# Patient Record
Sex: FEMALE | Race: WHITE | NOT HISPANIC OR LATINO | Employment: OTHER
[De-identification: names, ages, dates, MRNs, and addresses within clinical notes are randomized per-mention and may not be internally consistent; named-entity substitution may affect disease eponyms.]

---

## 2017-07-22 ENCOUNTER — HEALTH MAINTENANCE LETTER (OUTPATIENT)
Age: 57
End: 2017-07-22

## 2017-09-01 ENCOUNTER — TRANSFERRED RECORDS (OUTPATIENT)
Dept: HEALTH INFORMATION MANAGEMENT | Facility: CLINIC | Age: 57
End: 2017-09-01

## 2017-09-01 LAB
ALT SERPL-CCNC: 18 U/L (ref 7–52)
AST SERPL-CCNC: 22 U/L (ref 13–39)
CREAT SERPL-MCNC: 0.92 MG/DL (ref 0.6–1.3)
GFR SERPL CREATININE-BSD FRML MDRD: 68.9 ML/MIN/1.73M2
GLUCOSE SERPL-MCNC: 92 MG/DL (ref 70–110)
POTASSIUM SERPL-SCNC: 4.1 MMOL/L (ref 3.5–5.1)

## 2017-10-23 ENCOUNTER — RADIANT APPOINTMENT (OUTPATIENT)
Dept: GENERAL RADIOLOGY | Facility: CLINIC | Age: 57
End: 2017-10-23
Attending: FAMILY MEDICINE
Payer: COMMERCIAL

## 2017-10-23 ENCOUNTER — OFFICE VISIT (OUTPATIENT)
Dept: URGENT CARE | Facility: URGENT CARE | Age: 57
End: 2017-10-23
Payer: COMMERCIAL

## 2017-10-23 VITALS
SYSTOLIC BLOOD PRESSURE: 110 MMHG | WEIGHT: 130 LBS | TEMPERATURE: 98.3 F | BODY MASS INDEX: 20.98 KG/M2 | HEART RATE: 76 BPM | DIASTOLIC BLOOD PRESSURE: 70 MMHG | OXYGEN SATURATION: 98 % | RESPIRATION RATE: 14 BRPM

## 2017-10-23 DIAGNOSIS — S63.502A WRIST SPRAIN, LEFT, INITIAL ENCOUNTER: Primary | ICD-10-CM

## 2017-10-23 DIAGNOSIS — S63.502A WRIST SPRAIN, LEFT, INITIAL ENCOUNTER: ICD-10-CM

## 2017-10-23 DIAGNOSIS — Z23 NEED FOR PROPHYLACTIC VACCINATION AND INOCULATION AGAINST INFLUENZA: ICD-10-CM

## 2017-10-23 PROCEDURE — 99203 OFFICE O/P NEW LOW 30 MIN: CPT | Performed by: FAMILY MEDICINE

## 2017-10-23 PROCEDURE — 90686 IIV4 VACC NO PRSV 0.5 ML IM: CPT | Performed by: FAMILY MEDICINE

## 2017-10-23 PROCEDURE — 90471 IMMUNIZATION ADMIN: CPT | Performed by: FAMILY MEDICINE

## 2017-10-23 PROCEDURE — 73110 X-RAY EXAM OF WRIST: CPT | Mod: LT

## 2017-10-23 NOTE — NURSING NOTE
"Yue Case is a 57 year old female.      Chief Complaint   Patient presents with     Urgent Care     Musculoskeletal Problem     pt is here for L wrist pain - started yesterday when she fell - No head injury        Initial /70 (BP Location: Right arm, Cuff Size: Adult Regular)  Pulse 76  Temp 98.3  F (36.8  C) (Oral)  Resp 14  Wt 130 lb (59 kg)  SpO2 98%  BMI 20.98 kg/m2 Estimated body mass index is 20.98 kg/(m^2) as calculated from the following:    Height as of 9/29/15: 5' 6\" (1.676 m).    Weight as of this encounter: 130 lb (59 kg).  Medication Reconciliation: complete      Questioned patient about current smoking habits.  Pt. has never smoked.      Aminah Gutierrez CMA      "

## 2017-10-23 NOTE — MR AVS SNAPSHOT
After Visit Summary   10/23/2017    Yue Case    MRN: [id number]           Patient Information     Date Of Birth          1960        Visit Information        Provider Department      10/23/2017 6:10 PM Kanika Gross MD [facility]        Today's Diagnoses     Wrist sprain, left, initial encounter    -  1      Care Instructions      Understanding a Wrist Sprain    A ligament is a strong band of tissue that connects bone to bone. The wrist has many ligaments. If any of these get stretched or torn, this is called a wrist sprain. A wrist sprain can be painful. It can also limit movement and use of the wrist and hand. Depending on the severity of the sprain, it may take a few weeks or longer for the wrist to heal.  Causes of a wrist sprain  Wrist sprains are most often caused by falling and landing on an outstretched hand. Anyone can get a wrist sprain. But the injury may be more likely in people who are very active or play sports.  Symptoms of a wrist sprain  At the time of injury, you may feel a popping or tearing in the wrist. You may have pain, redness, and swelling. You may also have some bruising. In some cases, symptoms may spread from the wrist to the hand. Until the wrist has healed, it may be hard to move or use the wrist and hand for normal tasks and activities, especially gripping and lifting.  Treating a wrist sprain  Treatment for wrist sprains may include any of the following:     Prescription or over-the-counter medicines. These help reduce pain and swelling.    A splint, brace, or cast. This is worn to support the wrist and keep the wrist from moving. You may need it for several weeks or longer, until the wrist heals.    Physical therapy and exercises. These help improve strength, flexibility, and range of motion in the wrist and hand.    Surgery. You may need surgery if the sprain is severe. For example, if the ligament is torn or if nearby tissues and  bone are also injured. After surgery, you will often need to wear a splint or cast for a month or longer, until the wrist has healed.  Self-care measures  You can do several things to help relieve pain and swelling. These may include:    Limiting how much you move and use your wrist and hand    Applying an ice pack to the injured area    Keeping your wrist and hand raised (elevated) above heart level    Wrapping your wrist in an elastic bandage  Possible complications  If the injury doesn t heal properly, it has a higher chance of happening again. The injury can also become long-term (chronic). This can cause ongoing pain, weakness, or instability of the wrist. Over time, arthritis may develop in the wrist. This can worsen pain and cause stiffness and limited movement of the wrist and hand.        When to call your healthcare provider  Call your healthcare provider right away if you have any of these:    Fever of 100.4 F (38 C) or higher, or as directed    Symptoms that don t get better with treatment, or get worse    Hand or fingers that feel numb or very cold, turn blue or gray, or swell    Symptoms such as redness, warmth, swelling, bleeding, or drainage that occur at the incision sites. This only applies if you had surgery.    New symptoms   Date Last Reviewed: 3/10/2016    9732-1906 [employer]. [address] [place] [address], [place] [address]. All rights reserved. This information is not intended as a substitute for professional medical care. Always follow your healthcare professional's instructions.                Follow-ups after your visit        Who to contact     If you have questions or need follow up information about today's clinic visit or your schedule please contact [facility] directly at [phone].  Normal or non-critical lab and imaging results will be communicated to you by MyChart, letter or phone within 4 business days after the clinic has received the results. If  you do not hear from us within 7 days, please contact the clinic through [employer] or phone. If you have a critical or abnormal lab result, we will notify you by phone as soon as possible.  Submit refill requests through [employer] or call your pharmacy and they will forward the refill request to us. Please allow 3 business days for your refill to be completed.          Additional Information About Your Visit        [employer] Information     [employer] gives you secure access to your electronic health record. If you see a primary care provider, you can also send messages to your care team and make appointments. If you have questions, please call your primary care clinic.  If you do not have a primary care provider, please call [phone] and they will assist you.        Care EveryWhere ID     This is your Care EveryWhere ID. This could be used by other organizations to access your [facility] medical records  [id number]        Your Vitals Were     Pulse Temperature Respirations Pulse Oximetry BMI (Body Mass Index)       76 98.3  F (36.8  C) (Oral) 14 98% 20.98 kg/m2        Blood Pressure from Last 3 Encounters:   10/23/17 110/70   09/29/15 102/76   10/27/14 104/70    Weight from Last 3 Encounters:   10/23/17 130 lb (59 kg)   09/29/15 133 lb (60.3 kg)   10/27/14 131 lb (59.4 kg)                 Today's Medication Changes          These changes are accurate as of: 10/23/17  6:53 PM.  If you have any questions, ask your nurse or doctor.               Start taking these medicines.        Dose/Directions    order for DME   Used for:  Wrist sprain, left, initial encounter   Started by:  Kanika Gross MD        Left hand thumb spica splint   Quantity:  1 Units   Refills:  0            Where to get your medicines      Some of these will need a paper prescription and others can be bought over the counter.  Ask your nurse if you have questions.     Bring a paper prescription for each of these medications     order for DME                 Primary Care Provider    Physician No Ref-Primary       NO REF-PRIMARY PHYSICIAN        Equal Access to Services     MAYCOL FUNG : Hadii aad ku hadbrysongenevieve Sotabitha, walevida lutrinityadaha, qaybta jasmynejhonyjorge polanco. So [facility] [phone].    ATENCIÓN: Si habla español, tiene a oliveira disposición servicios gratuitos de asistencia lingüística. Llame al [phone].    We comply with applicable federal civil rights laws and [place] laws. We do not discriminate on the basis of race, color, national origin, age, disability, sex, sexual orientation, or gender identity.            Thank you!     Thank you for choosing [facility]  for your care. Our goal is always to provide you with excellent care. Hearing back from our patients is one way we can continue to improve our services. Please take a few minutes to complete the written survey that you may receive in the mail after your visit with us. Thank you!             Your Updated Medication List - Protect others around you: Learn how to safely use, store and throw away your medicines at www.disposemymeds.org.          This list is accurate as of: 10/23/17  6:53 PM.  Always use your most recent med list.                   Brand Name Dispense Instructions for use Diagnosis    order for DME     1 Units    Left hand thumb spica splint    Wrist sprain, left, initial encounter       DARLING PEPE

## 2017-10-23 NOTE — PATIENT INSTRUCTIONS
Understanding a Wrist Sprain    A ligament is a strong band of tissue that connects bone to bone. The wrist has many ligaments. If any of these get stretched or torn, this is called a wrist sprain. A wrist sprain can be painful. It can also limit movement and use of the wrist and hand. Depending on the severity of the sprain, it may take a few weeks or longer for the wrist to heal.  Causes of a wrist sprain  Wrist sprains are most often caused by falling and landing on an outstretched hand. Anyone can get a wrist sprain. But the injury may be more likely in people who are very active or play sports.  Symptoms of a wrist sprain  At the time of injury, you may feel a popping or tearing in the wrist. You may have pain, redness, and swelling. You may also have some bruising. In some cases, symptoms may spread from the wrist to the hand. Until the wrist has healed, it may be hard to move or use the wrist and hand for normal tasks and activities, especially gripping and lifting.  Treating a wrist sprain  Treatment for wrist sprains may include any of the following:     Prescription or over-the-counter medicines. These help reduce pain and swelling.    A splint, brace, or cast. This is worn to support the wrist and keep the wrist from moving. You may need it for several weeks or longer, until the wrist heals.    Physical therapy and exercises. These help improve strength, flexibility, and range of motion in the wrist and hand.    Surgery. You may need surgery if the sprain is severe. For example, if the ligament is torn or if nearby tissues and bone are also injured. After surgery, you will often need to wear a splint or cast for a month or longer, until the wrist has healed.  Self-care measures  You can do several things to help relieve pain and swelling. These may include:    Limiting how much you move and use your wrist and hand    Applying an ice pack to the injured area    Keeping your wrist and hand raised  (elevated) above heart level    Wrapping your wrist in an elastic bandage  Possible complications  If the injury doesn t heal properly, it has a higher chance of happening again. The injury can also become long-term (chronic). This can cause ongoing pain, weakness, or instability of the wrist. Over time, arthritis may develop in the wrist. This can worsen pain and cause stiffness and limited movement of the wrist and hand.        When to call your healthcare provider  Call your healthcare provider right away if you have any of these:    Fever of 100.4 F (38 C) or higher, or as directed    Symptoms that don t get better with treatment, or get worse    Hand or fingers that feel numb or very cold, turn blue or gray, or swell    Symptoms such as redness, warmth, swelling, bleeding, or drainage that occur at the incision sites. This only applies if you had surgery.    New symptoms   Date Last Reviewed: 3/10/2016    7572-5306 [employer]. [address] [place] [address], [place] [address]. All rights reserved. This information is not intended as a substitute for professional medical care. Always follow your healthcare professional's instructions.

## 2017-10-24 NOTE — PROGRESS NOTES
SUBJECTIVE:  Yue Case is a 57 year old female  presents with a chief complaint of left  wrist pain, swelling, tenderness and decreased range of motion.  The injury occurred 1 day(s) ago.   The injury happened while while walking. How: fall, landed on the left hand with left wrist/ base of thumb stretched,  immediate pain, no deformity was noted by the patient.   The patient complained of moderate pain  and has had decreased ROM.    Pain exacerbated by flexion/extension of the wrist and moving the left thumb.   Relieved by rest and ice.    He treated it initially with ice and Ibuprofen.   This is the first time this type of injury has occurred to this patient.     She wants an influenza vaccine    Past Medical History:   Diagnosis Date     Basal cell carcinoma     none     Osteopenia      -2improved on therapy to -1.4sp,-1.6hip in  astopped mesds     Osteoporosis     post menopausal on HRT       ALLERGIES:  Review of patient's allergies indicates no known allergies.      No current outpatient prescriptions on file prior to visit.  No current facility-administered medications on file prior to visit.     Social History   Substance Use Topics     Smoking status: Never Smoker     Smokeless tobacco: Never Used     Alcohol use Yes      Comment: moderate       Family History   Problem Relation Age of Onset     CANCER Mother      ovarian cancer,  age 64     Respiratory Father      COPD,  age 70     HEART DISEASE Brother      MI, stent placement age 52     DIABETES Paternal Aunt      Breast Cancer No family hx of      Cancer - colorectal No family hx of      OSTEOPOROSIS Father      Ovarian Cancer Mother      Hyperlipidemia Father      Coronary Artery Disease Maternal Grandfather      Review Of Systems    Constitutional:  Negative for fevers, chills, fatigue  Skin: negative for rash or lesions  Eyes: negative for eye pain, visual changes  Ears/Nose/Throat: negative for earache  , sinus trouble,  persistent sore throat  Respiratory: No shortness of breath, dyspnea on exertion  or hemoptysis  Cardiovascular: negative for, palpitations, tachycardia and chest pain  Gastrointestinal: negative for vomiting, abdominal pain and diarrhea  Genitourinary: negative for dysuria and hematuria  Back: negative for pain with back movement,  CVA pain  Musculoskeletal: negative for generalized joint pain, joint swelling and joint stiffness  Neurologic: negative for generalized or local weakness or incoordination       EXAM:   /70 (BP Location: Right arm, Cuff Size: Adult Regular)  Pulse 76  Temp 98.3  F (36.8  C) (Oral)  Resp 14  Wt 130 lb (59 kg)  SpO2 98%  BMI 20.98 kg/m2  Gen: alert, mild distress, cooperative    Extremity: left wrist  has point tenderness base of thumb and pain with flexion/ extension of the wrist.   ROM of wrist limited by pain  Ipsilateral elbow and shoulder with  Pain free ROM  Motor, sensation intact all fingers, no pain with ROM of fingers  There is not compromise to the distal circulation.  Pulses are +2 and CRT is brisk  EYES:  PERRLA, EOMI  Ears:  Normal hearing of conversation  NECK: supple,  FROM   RESP-  Comfortable, non- labored breathing  MS: no gross deformities noted, no evidence of inflammation in joints, FROM in all extremities.  SKIN: no suspicious lesions or rashes  NEURO: Normal strength and tone, sensory exam grossly normal, mentation intact and speech normal       X-RAY was done.-  No fracture    ASSESSMENT:   Wrist sprain, left, initial encounter     - XR Wrist Left G/E 3 Views; Future  - order for DME; Left hand thumb spica splint-  Wear splint until pain is improved      warm packs to improve circulation to the injured area  Ibuprofen/ acetaminophen as an alternative for pain  Wrist splint  Follow-up with primary care or orthopedics if no improvement after 2 weeks      Need for prophylactic vaccination and inoculation against influenza     - FLU VAC, SPLIT VIRUS IM > 3  YO (QUADRIVALENT) [92186]  - Vaccine Administration, Initial [95541]

## 2017-10-24 NOTE — PROGRESS NOTES

## 2019-09-18 ENCOUNTER — ANCILLARY PROCEDURE (OUTPATIENT)
Dept: BONE DENSITY | Facility: CLINIC | Age: 59
End: 2019-09-18
Attending: SPECIALIST
Payer: COMMERCIAL

## 2019-09-18 DIAGNOSIS — M81.0 OSTEOPOROSIS: ICD-10-CM

## 2019-09-18 PROCEDURE — 77080 DXA BONE DENSITY AXIAL: CPT | Performed by: INTERNAL MEDICINE

## 2019-11-03 ENCOUNTER — HEALTH MAINTENANCE LETTER (OUTPATIENT)
Age: 59
End: 2019-11-03

## 2020-05-11 ENCOUNTER — TRANSFERRED RECORDS (OUTPATIENT)
Dept: HEALTH INFORMATION MANAGEMENT | Facility: CLINIC | Age: 60
End: 2020-05-11

## 2020-08-10 ENCOUNTER — OFFICE VISIT (OUTPATIENT)
Dept: PODIATRY | Facility: CLINIC | Age: 60
End: 2020-08-10
Payer: COMMERCIAL

## 2020-08-10 VITALS
DIASTOLIC BLOOD PRESSURE: 70 MMHG | WEIGHT: 138 LBS | BODY MASS INDEX: 22.18 KG/M2 | SYSTOLIC BLOOD PRESSURE: 118 MMHG | HEIGHT: 66 IN

## 2020-08-10 DIAGNOSIS — L85.9 HYPERKERATOSIS: Primary | ICD-10-CM

## 2020-08-10 PROCEDURE — 99203 OFFICE O/P NEW LOW 30 MIN: CPT | Performed by: PODIATRIST

## 2020-08-10 ASSESSMENT — MIFFLIN-ST. JEOR: SCORE: 1212.71

## 2020-08-10 NOTE — PATIENT INSTRUCTIONS
Thank you for choosing [facility]!    DR. CASTILLO'S CLINIC LOCATIONS:   [place] [id number] [facility] [address]   [place] [address]                        [place] [address]   [phone]      SET UP SURGERY: [phone]   APPOINTMENTS: [phone]   BILLING QUESTIONS: [phone]   FAX NUMBER: [phone]     Follow up: as needed      Please read through the following handouts and if you have any questions, please feel free to call us or send a [employer] message!      CALLUS / CORNS / IPKs  When there is excessive friction or pressure on the skin, the body responds by making the skin thicker to protect the deeper structures from becoming exposed. While this works well to protect the deeper structures, the thickened skin can increase pressure and pain.    CALLUS: Flat, diffuse thickening are simple calluses and they are usually caused by friction. Often these are the result of rubbing on a shoe or going barefoot.    CORNS: Calluses with a central core between the toes are called corns. These result from prominent joints on adjacent toes rubbing together. Theses are a symptom of bone malalignment and will always recur unless the underlying bones are addressed surgically.    IPKs: Calluses with a central core on the ball of the foot are usually IPKs (intractable plantar keratosis). These are caused by excessive pressure from the metatarsals, the bones that make up the ball of the foot. Often one of these bones is too long or too prominent.  Again, these will always recur unless the underlying bone issue is addressed. There is no cure for these. They will either go away by themselves, recur, or more could develop.    ROUTINE MAINTENANCE  1. File them down with a pumice stone or callus file a couple times a week.   2. An electric callus  removing device. Amope Pedi Perfect Electronic Pedicure Foot File and Callus Remover can be a good option.   3. Lotion can be applied to soften the callus. A urea based cream such as Kersal or Vanicream or thicker cream with shea butter are good options.  4. Toe spacers or toe covers can be used for corns, gel pads can be used for other lesions on the bottom of the foot.   If there is a surgical pathology noted, such as a prominent bone, often this needs to be addressed surgically to minimize recurrence. However, sometimes the lesion simply migrates to another spot after surgery, so it is not a guaranteed cure.     There was also discussion of the cost structure of callus care if they were to come back and have it treated in the clinic. Explained that if insurance does not cover it, they would be billed. This charge could range from $100 - $160.  They were also provided information on places to get the callus treatment.      [email or web address] or call [email or web address]  TOE SPACERS            Yue to follow up with Primary Care provider regarding elevated blood pressure. (only if 140/90 or more)    (BMI) Body Mass Index  Many things can cause foot and ankle problems. Foot structure, activity level, foot mechanics and injuries are common causes of pain.One very important issue that often goes unmentioned, is body weight.  Extra weight can cause increased stress on muscles, ligaments, bones and tendons. Sometimes just a few extra pounds is all it takes to put one over her/his threshold. Without reducing that stress, it can be difficult to alleviate pain. Some people are uncomfortable addressing this issue, but we feel it is important for you to think about it. As Foot & Ankle specialists, our job is addressing the lower extremity problem and possible causes. Regarding extra body weight, we encourage patients to discuss diet and weight management plans with their primary care doctors. It is this team approach that gives you  the best opportunity for pain relief and getting you back on your feet.

## 2020-08-10 NOTE — PROGRESS NOTES
"Foot & Ankle Surgery  August 10, 2020    CC: \"painful corn on small toe\"    I was asked to see Yue Case regarding the chief complaint by:  self    HPI:  Pt is a 60 year old female who presents with above complaint.  Painful issue x 3 months.  Wants to know \"how best to treat\".  Burning, shooting pain.  Not too bad with sandals on, but painful with closed-toe shoes.  Likes to bike, got wider/bigger biking shoes, but still has pain.  Worse with \"wearing socks & shoes\".  Has tried \"pads\", works \"most of the time\".      ROS:   Pos for CC.  The patient denies current nausea, vomiting, chills, fevers, belly pain, calf pain, chest pain or SOB.  Complete remainder of ROS is otherwise neg.    VITALS:    Vitals:    08/10/20 0914   BP: 118/70   Weight: 62.6 kg (138 lb)   Height: 1.676 m (5' 6\")       PMH:    Past Medical History:   Diagnosis Date     Basal cell carcinoma     none     Osteopenia      -2improved on therapy to -1.4sp,-1.6hip in  astopped mesds     Osteoporosis     post menopausal on HRT       SXHX:    Past Surgical History:   Procedure Laterality Date     C NONSPECIFIC PROCEDURE      virtrectomies (bilat)     C NONSPECIFIC PROCEDURE      foot surgery for bone spurs     COLONOSCOPY  -10    Repeat in 10 yrs.      EXTRACAPSULAR CATARACT EXTRATION WITH INTRAOCULAR LENS IMPLANT  2011    rt eye        MEDS:    Current Outpatient Medications   Medication     Denosumab (PROLIA SC)     order for DME     No current facility-administered medications for this visit.        ALL:   No Known Allergies    FMH:    Family History   Problem Relation Age of Onset     Cancer Mother         ovarian cancer,  age 64     Respiratory Father         COPD,  age 70     Heart Disease Brother         MI, stent placement age 52     Diabetes Paternal Aunt      Breast Cancer No family hx of      Cancer - colorectal No family hx of      Osteoporosis Father      Ovarian Cancer Mother      Hyperlipidemia Father      " Coronary Artery Disease Maternal Grandfather        SocHx:    Social History     Socioeconomic History     Marital status:      Spouse name: Not on file     Number of children: Not on file     Years of education: Not on file     Highest education level: Not on file   Occupational History     Not on file   Social Needs     Financial resource strain: Not on file     Food insecurity     Worry: Not on file     Inability: Not on file     Transportation needs     Medical: Not on file     Non-medical: Not on file   Tobacco Use     Smoking status: Never Smoker     Smokeless tobacco: Never Used   Substance and Sexual Activity     Alcohol use: Yes     Comment: moderate     Drug use: No     Sexual activity: Never     Partners: Male   Lifestyle     Physical activity     Days per week: Not on file     Minutes per session: Not on file     Stress: Not on file   Relationships     Social connections     Talks on phone: Not on file     Gets together: Not on file     Attends Orthodoxy service: Not on file     Active member of club or organization: Not on file     Attends meetings of clubs or organizations: Not on file     Relationship status: Not on file     Intimate partner violence     Fear of current or ex partner: Not on file     Emotionally abused: Not on file     Physically abused: Not on file     Forced sexual activity: Not on file   Other Topics Concern     Parent/sibling w/ CABG, MI or angioplasty before 65F 55M? Not Asked   Social History Narrative     Not on file           EXAMINATION:  Gen:   No apparent distress  Neuro:   A&Ox3, no deficits  Psych:    Answering questions appropriately for age and situation with normal affect  Head:    NCAT  Eye:    Visual scanning without deficit  Ear:    Response to auditory stimuli wnl  Lung:    Non-labored breathing on RA noted  Abd:    NTND per patient report  Lymph:    Neg for pitting/non-pitting edema BLE  Vasc:    Pulses palpable, CFT minimally delayed  Neuro:    Light touch  sensation intact to all sensory nerve distributions without paresthesias  Derm:    Nucleated hyperkeratosis lateral L 4th toe.  No verrucous lesion. No pigment changes.  MSK:    Mild adductovarus 5th hammertoe.  Mild hammering of remaining lesser toes left lower extremity   Calf:    Neg for redness, swelling or tenderness    Assessment:  60 year old female with painful hyperkeratosis lateral L 4th toe in setting of lesser digital hammertoes      Plan:  Discussed etiologies, anatomy and options  1.  Painful hyperkeratosis lateral L 4th toe in setting of lesser digital hammertoes  -We discussed that many hyper-keratotic lesions are caused by pressure or shearing forces on the skin, and these can often be treated sufficiently with shoes, inserts and local tissue debridement.  Some lesions, however, can have a deep core, and while home treatments are helpful, occasional clinical debridement may be necessary.  In certain cases, surgical excision may be required if no other treatments have proven sufficient.  -Our home instruction handout was dispensed, detailing home therapies to minimize regrowth of the hyperkeratotic tissue.    -we discussed a rough estimate of the cost of debridement in clinic, and how insurance may not cover the cost meaning the patient may be responsible.    -debrided lateral L 4th toe lesoin with 15 blade without incident, no charge  -toe spacer handout dispensed. Discussed accommodative shoe gear    Follow up:  prn or sooner with acute issues      Patient's medical history was reviewed today    Body mass index is 22.27 kg/m .          Toño Valdez DPM FACFAS FACFAOM  Podiatric Foot & Ankle Surgeon  [facility]  [phone]

## 2020-08-12 ENCOUNTER — TRANSFERRED RECORDS (OUTPATIENT)
Dept: HEALTH INFORMATION MANAGEMENT | Facility: CLINIC | Age: 60
End: 2020-08-12

## 2020-11-16 ENCOUNTER — HEALTH MAINTENANCE LETTER (OUTPATIENT)
Age: 60
End: 2020-11-16

## 2021-09-18 ENCOUNTER — HEALTH MAINTENANCE LETTER (OUTPATIENT)
Age: 61
End: 2021-09-18

## 2021-10-06 ENCOUNTER — TELEPHONE (OUTPATIENT)
Dept: BONE DENSITY | Facility: CLINIC | Age: 61
End: 2021-10-06
Payer: COMMERCIAL

## 2021-11-13 ENCOUNTER — HEALTH MAINTENANCE LETTER (OUTPATIENT)
Age: 61
End: 2021-11-13

## 2022-01-08 ENCOUNTER — HEALTH MAINTENANCE LETTER (OUTPATIENT)
Age: 62
End: 2022-01-08

## 2022-05-09 SDOH — ECONOMIC STABILITY: INCOME INSECURITY: IN THE LAST 12 MONTHS, WAS THERE A TIME WHEN YOU WERE NOT ABLE TO PAY THE MORTGAGE OR RENT ON TIME?: NO

## 2022-05-09 SDOH — ECONOMIC STABILITY: FOOD INSECURITY: WITHIN THE PAST 12 MONTHS, THE FOOD YOU BOUGHT JUST DIDN'T LAST AND YOU DIDN'T HAVE MONEY TO GET MORE.: NEVER TRUE

## 2022-05-09 SDOH — HEALTH STABILITY: PHYSICAL HEALTH: ON AVERAGE, HOW MANY MINUTES DO YOU ENGAGE IN EXERCISE AT THIS LEVEL?: 60 MIN

## 2022-05-09 SDOH — ECONOMIC STABILITY: INCOME INSECURITY: HOW HARD IS IT FOR YOU TO PAY FOR THE VERY BASICS LIKE FOOD, HOUSING, MEDICAL CARE, AND HEATING?: NOT HARD AT ALL

## 2022-05-09 SDOH — ECONOMIC STABILITY: TRANSPORTATION INSECURITY
IN THE PAST 12 MONTHS, HAS THE LACK OF TRANSPORTATION KEPT YOU FROM MEDICAL APPOINTMENTS OR FROM GETTING MEDICATIONS?: NO

## 2022-05-09 SDOH — HEALTH STABILITY: PHYSICAL HEALTH: ON AVERAGE, HOW MANY DAYS PER WEEK DO YOU ENGAGE IN MODERATE TO STRENUOUS EXERCISE (LIKE A BRISK WALK)?: 5 DAYS

## 2022-05-09 SDOH — ECONOMIC STABILITY: TRANSPORTATION INSECURITY
IN THE PAST 12 MONTHS, HAS LACK OF TRANSPORTATION KEPT YOU FROM MEETINGS, WORK, OR FROM GETTING THINGS NEEDED FOR DAILY LIVING?: NO

## 2022-05-09 SDOH — ECONOMIC STABILITY: FOOD INSECURITY: WITHIN THE PAST 12 MONTHS, YOU WORRIED THAT YOUR FOOD WOULD RUN OUT BEFORE YOU GOT MONEY TO BUY MORE.: NEVER TRUE

## 2022-05-09 ASSESSMENT — ENCOUNTER SYMPTOMS
CHILLS: 0
ABDOMINAL PAIN: 0
BREAST MASS: 0
FEVER: 0
ARTHRALGIAS: 1
MYALGIAS: 0
COUGH: 0
NERVOUS/ANXIOUS: 0
SORE THROAT: 0
HEMATURIA: 0
NAUSEA: 0
PARESTHESIAS: 0
HEADACHES: 0
DYSURIA: 0
WEAKNESS: 0
SHORTNESS OF BREATH: 0
HEARTBURN: 0
FREQUENCY: 0
EYE PAIN: 0
DIARRHEA: 0
CONSTIPATION: 0
DIZZINESS: 1
HEMATOCHEZIA: 0
PALPITATIONS: 0
JOINT SWELLING: 0

## 2022-05-09 ASSESSMENT — LIFESTYLE VARIABLES
HOW OFTEN DO YOU HAVE SIX OR MORE DRINKS ON ONE OCCASION: NEVER
SKIP TO QUESTIONS 9-10: 1
HOW MANY STANDARD DRINKS CONTAINING ALCOHOL DO YOU HAVE ON A TYPICAL DAY: 1 OR 2
AUDIT-C TOTAL SCORE: 1
HOW OFTEN DO YOU HAVE A DRINK CONTAINING ALCOHOL: MONTHLY OR LESS

## 2022-05-09 ASSESSMENT — SOCIAL DETERMINANTS OF HEALTH (SDOH)
HOW OFTEN DO YOU GET TOGETHER WITH FRIENDS OR RELATIVES?: ONCE A WEEK
IN A TYPICAL WEEK, HOW MANY TIMES DO YOU TALK ON THE PHONE WITH FAMILY, FRIENDS, OR NEIGHBORS?: MORE THAN THREE TIMES A WEEK
HOW OFTEN DO YOU ATTEND CHURCH OR RELIGIOUS SERVICES?: 1 TO 4 TIMES PER YEAR
DO YOU BELONG TO ANY CLUBS OR ORGANIZATIONS SUCH AS CHURCH GROUPS UNIONS, FRATERNAL OR ATHLETIC GROUPS, OR SCHOOL GROUPS?: NO

## 2022-05-10 ENCOUNTER — OFFICE VISIT (OUTPATIENT)
Dept: FAMILY MEDICINE | Facility: CLINIC | Age: 62
End: 2022-05-10
Payer: COMMERCIAL

## 2022-05-10 VITALS
BODY MASS INDEX: 22.67 KG/M2 | WEIGHT: 136.1 LBS | OXYGEN SATURATION: 97 % | RESPIRATION RATE: 18 BRPM | DIASTOLIC BLOOD PRESSURE: 68 MMHG | TEMPERATURE: 98.2 F | HEART RATE: 60 BPM | HEIGHT: 65 IN | SYSTOLIC BLOOD PRESSURE: 108 MMHG

## 2022-05-10 DIAGNOSIS — Z12.4 CERVICAL CANCER SCREENING: ICD-10-CM

## 2022-05-10 DIAGNOSIS — Z13.220 SCREENING FOR HYPERLIPIDEMIA: ICD-10-CM

## 2022-05-10 DIAGNOSIS — Z00.00 ROUTINE GENERAL MEDICAL EXAMINATION AT A HEALTH CARE FACILITY: ICD-10-CM

## 2022-05-10 DIAGNOSIS — D47.2 MGUS (MONOCLONAL GAMMOPATHY OF UNKNOWN SIGNIFICANCE): ICD-10-CM

## 2022-05-10 DIAGNOSIS — M81.0 OSTEOPOROSIS, UNSPECIFIED OSTEOPOROSIS TYPE, UNSPECIFIED PATHOLOGICAL FRACTURE PRESENCE: ICD-10-CM

## 2022-05-10 DIAGNOSIS — Z12.31 VISIT FOR SCREENING MAMMOGRAM: ICD-10-CM

## 2022-05-10 LAB
ERYTHROCYTE [DISTWIDTH] IN BLOOD BY AUTOMATED COUNT: 14.7 % (ref 10–15)
HCT VFR BLD AUTO: 39.9 % (ref 35–47)
HGB BLD-MCNC: 12.8 G/DL (ref 11.7–15.7)
MCH RBC QN AUTO: 28.6 PG (ref 26.5–33)
MCHC RBC AUTO-ENTMCNC: 32.1 G/DL (ref 31.5–36.5)
MCV RBC AUTO: 89 FL (ref 78–100)
PLATELET # BLD AUTO: 275 10E3/UL (ref 150–450)
RBC # BLD AUTO: 4.48 10E6/UL (ref 3.8–5.2)
TOTAL PROTEIN SERUM FOR ELP: 7.2 G/DL (ref 6.8–8.8)
WBC # BLD AUTO: 4.8 10E3/UL (ref 4–11)

## 2022-05-10 PROCEDURE — 84155 ASSAY OF PROTEIN SERUM: CPT | Mod: 59 | Performed by: FAMILY MEDICINE

## 2022-05-10 PROCEDURE — 99386 PREV VISIT NEW AGE 40-64: CPT | Performed by: FAMILY MEDICINE

## 2022-05-10 PROCEDURE — 36415 COLL VENOUS BLD VENIPUNCTURE: CPT | Performed by: FAMILY MEDICINE

## 2022-05-10 PROCEDURE — 80053 COMPREHEN METABOLIC PANEL: CPT | Performed by: FAMILY MEDICINE

## 2022-05-10 PROCEDURE — 80061 LIPID PANEL: CPT | Performed by: FAMILY MEDICINE

## 2022-05-10 PROCEDURE — 87624 HPV HI-RISK TYP POOLED RSLT: CPT | Performed by: FAMILY MEDICINE

## 2022-05-10 PROCEDURE — 85027 COMPLETE CBC AUTOMATED: CPT | Performed by: FAMILY MEDICINE

## 2022-05-10 PROCEDURE — 84165 PROTEIN E-PHORESIS SERUM: CPT

## 2022-05-10 PROCEDURE — G0145 SCR C/V CYTO,THINLAYER,RESCR: HCPCS | Performed by: FAMILY MEDICINE

## 2022-05-10 ASSESSMENT — ENCOUNTER SYMPTOMS
CONSTIPATION: 0
FEVER: 0
HEMATOCHEZIA: 0
CHILLS: 0
DIARRHEA: 0
HEARTBURN: 0
NERVOUS/ANXIOUS: 0
HEADACHES: 0
EYE PAIN: 0
DIZZINESS: 1
FREQUENCY: 0
MYALGIAS: 0
PALPITATIONS: 0
SORE THROAT: 0
NAUSEA: 0
WEAKNESS: 0
SHORTNESS OF BREATH: 0
PARESTHESIAS: 0
JOINT SWELLING: 0
ARTHRALGIAS: 1
HEMATURIA: 0
ABDOMINAL PAIN: 0
COUGH: 0
DYSURIA: 0
BREAST MASS: 0

## 2022-05-10 NOTE — PROGRESS NOTES
SUBJECTIVE:   CC: Yue Case is an 62 year old woman who presents for preventive health visit.     In clinic for annual physical exam.  Patient is overall doing  Patient is due for physical exam, patient follows up with endocrinology for osteoporosis currently on Prolia .    Patient  was following [place] oncology Dr. Benjamin ,following with oncology for MGUS once a year .    On prolia and follows with endocrinology       Patient has been advised of split billing requirements and indicates understanding: Yes  Healthy Habits:     Getting at least 3 servings of Calcium per day:  Yes    Bi-annual eye exam:  Yes    Dental care twice a year:  Yes    Sleep apnea or symptoms of sleep apnea:  None    Diet:  Regular (no restrictions)    Frequency of exercise:  4-5 days/week    Duration of exercise:  45-60 minutes    Taking medications regularly:  Not Applicable    Medication side effects:  Not applicable    PHQ-2 Total Score: 0    Additional concerns today:  Yes          Today's PHQ-2 Score:   PHQ-2 ( 1999 [employer]) 5/9/2022   Q1: Little interest or pleasure in doing things 0   Q2: Feeling down, depressed or hopeless 0   PHQ-2 Score 0   Q1: Little interest or pleasure in doing things Not at all   Q2: Feeling down, depressed or hopeless Not at all   PHQ-2 Score 0       Abuse: Current or Past (Physical, Sexual or Emotional) - No  Do you feel safe in your environment? Yes    Have you ever done Advance Care Planning? (For example, a Health Directive, POLST, or a discussion with a medical provider or your loved ones about your wishes): No, advance care planning information given to patient to review.  Patient plans to discuss their wishes with loved ones or provider.      Social History     Tobacco Use     Smoking status: Never Smoker     Smokeless tobacco: Never Used   Substance Use Topics     Alcohol use: Not Currently     Comment: occ         Alcohol Use 5/9/2022   Prescreen: >3 drinks/day or >7 drinks/week? No        Reviewed orders with patient.  Reviewed health maintenance and updated orders accordingly - Yes      Breast Cancer Screening:    FHS-7:   Breast CA Risk Assessment (FHS-7) 5/9/2022   Did any of your first-degree relatives have breast or ovarian cancer? Yes   Did any of your relatives have bilateral breast cancer? No   Did any man in your family have breast cancer? No   Did any woman in your family have breast and ovarian cancer? Yes   Did any woman in your family have breast cancer before age 50 y? No   Do you have 2 or more relatives with breast and/or ovarian cancer? No   Do you have 2 or more relatives with breast and/or bowel cancer? No     click delete button to remove this line now  Mammogram Screening: Recommended mammography every 1-2 years with patient discussion and risk factor consideration  Pertinent mammograms are reviewed under the imaging tab.    History of abnormal Pap smear: NO - age 30-65 PAP every 5 years with negative HPV co-testing recommended  PAP / HPV 6/1/2011   PAP (Historical) nil     Reviewed and updated as needed this visit by clinical staff   Tobacco  Allergies  Meds   Med Hx  Surg Hx  Fam Hx  Soc Hx          Reviewed and updated as needed this visit by Provider                   Past Medical History:   Diagnosis Date     Basal cell carcinoma     none     Osteopenia     2004 -2improved on therapy to -1.4sp,-1.6hip in 2007 astopped mesds     Osteoporosis     post menopausal on HRT      Past Surgical History:   Procedure Laterality Date     COLONOSCOPY  7-2-10    Repeat in 10 yrs.      EXTRACAPSULAR CATARACT EXTRATION WITH INTRAOCULAR LENS IMPLANT  7/2011    rt eye     ZZC NONSPECIFIC PROCEDURE      virtrectomies (bilat)     ZZ NONSPECIFIC PROCEDURE      foot surgery for bone spurs       Review of Systems   Constitutional: Negative for chills and fever.   HENT: Negative for congestion, ear pain, hearing loss and sore throat.    Eyes: Positive for visual disturbance. Negative  "for pain.   Respiratory: Negative for cough and shortness of breath.    Cardiovascular: Negative for chest pain, palpitations and peripheral edema.   Gastrointestinal: Negative for abdominal pain, constipation, diarrhea, heartburn, hematochezia and nausea.   Breasts:  Negative for tenderness, breast mass and discharge.   Genitourinary: Negative for dysuria, frequency, genital sores, hematuria, pelvic pain, urgency, vaginal bleeding and vaginal discharge.   Musculoskeletal: Positive for arthralgias. Negative for joint swelling and myalgias.   Skin: Negative for rash.   Neurological: Positive for dizziness. Negative for weakness, headaches and paresthesias.   Psychiatric/Behavioral: Negative for mood changes. The patient is not nervous/anxious.           OBJECTIVE:   /68   Pulse 60   Temp 98.2  F (36.8  C) (Oral)   Resp 18   Ht 1.657 m (5' 5.25\")   Wt 61.7 kg (136 lb 1.6 oz)   SpO2 97%   BMI 22.48 kg/m    Physical Exam  Vitals and nursing note reviewed.   HENT:      Head: Normocephalic.      Right Ear: Tympanic membrane normal.      Nose: Nose normal.      Mouth/Throat:      Mouth: Mucous membranes are moist.   Cardiovascular:      Rate and Rhythm: Normal rate.      Pulses: Normal pulses.   Pulmonary:      Effort: Pulmonary effort is normal.   Musculoskeletal:      Cervical back: Normal range of motion.   Skin:     General: Skin is warm.   Neurological:      General: No focal deficit present.      Mental Status: She is alert.   Psychiatric:         Mood and Affect: Mood normal.     pelvic - atrophic vaginitis     ASSESSMENT/PLAN:   (Z00.00) Routine general medical examination at a health care facility  Comment: Discussed healthy eating   Plan: Discussed maintaining ideal weight   Discussed increased physical activity   (Z13.220) Screening for hyperlipidemia  Comment:   Plan: Lipid panel reflex to direct LDL Non-fasting            (Z12.4) Cervical cancer screening  Comment:   Plan: Pap Screen with HPV - " "recommended age 30 - 65         years        Mother - history of ovarian cancer , patient will contact with any concern .    (Z12.31) Visit for screening mammogram  Comment:   Plan: MA SCREENING DIGITAL BILAT - Future  (s+30)              (D47.2) MGUS (monoclonal gammopathy of unknown significance)  Comment:   Plan: CBC with platelets, Protein electrophoresis,         Comprehensive metabolic panel (BMP + Alb, Alk         Phos, ALT, AST, Total. Bili, TP), Adult         Oncology/Hematology Referral            (M81.0) Osteoporosis, unspecified osteoporosis type, unspecified pathological fracture presence  Comment: On Prolia     Plan: Following with endocrinology     Patient has been advised of split billing requirements and indicates understanding: Yes    COUNSELING:  Reviewed preventive health counseling, as reflected in patient instructions       Regular exercise       Healthy diet/nutrition       Vision screening    Estimated body mass index is 22.48 kg/m  as calculated from the following:    Height as of this encounter: 1.657 m (5' 5.25\").    Weight as of this encounter: 61.7 kg (136 lb 1.6 oz).    Weight management plan: Discussed healthy diet and exercise guidelines    She reports that she has never smoked. She has never used smokeless tobacco.      Counseling Resources:  ATP IV Guidelines  Pooled Cohorts Equation Calculator  Breast Cancer Risk Calculator  BRCA-Related Cancer Risk Assessment: FHS-7 Tool  FRAX Risk Assessment  ICSI Preventive Guidelines  Dietary Guidelines for Americans, 2010  USDA's MyPlate  ASA Prophylaxis  Lung CA Screening    Elida Ratliff MD  [facility]  "

## 2022-05-11 LAB
ALBUMIN SERPL-MCNC: 4 G/DL (ref 3.4–5)
ALP SERPL-CCNC: 54 U/L (ref 40–150)
ALT SERPL W P-5'-P-CCNC: 27 U/L (ref 0–50)
ANION GAP SERPL CALCULATED.3IONS-SCNC: 4 MMOL/L (ref 3–14)
AST SERPL W P-5'-P-CCNC: 16 U/L (ref 0–45)
BILIRUB SERPL-MCNC: 1 MG/DL (ref 0.2–1.3)
BUN SERPL-MCNC: 23 MG/DL (ref 7–30)
CALCIUM SERPL-MCNC: 9.4 MG/DL (ref 8.5–10.1)
CHLORIDE BLD-SCNC: 109 MMOL/L (ref 94–109)
CHOLEST SERPL-MCNC: 236 MG/DL
CO2 SERPL-SCNC: 27 MMOL/L (ref 20–32)
CREAT SERPL-MCNC: 0.92 MG/DL (ref 0.52–1.04)
FASTING STATUS PATIENT QL REPORTED: YES
GFR SERPL CREATININE-BSD FRML MDRD: 70 ML/MIN/1.73M2
GLUCOSE BLD-MCNC: 102 MG/DL (ref 70–99)
HDLC SERPL-MCNC: 88 MG/DL
LDLC SERPL CALC-MCNC: 130 MG/DL
NONHDLC SERPL-MCNC: 148 MG/DL
POTASSIUM BLD-SCNC: 4.7 MMOL/L (ref 3.4–5.3)
PROT SERPL-MCNC: 7.5 G/DL (ref 6.8–8.8)
SODIUM SERPL-SCNC: 140 MMOL/L (ref 133–144)
TRIGL SERPL-MCNC: 91 MG/DL

## 2022-05-13 LAB
ALBUMIN SERPL ELPH-MCNC: 4.7 G/DL (ref 3.7–5.1)
ALPHA1 GLOB SERPL ELPH-MCNC: 0.2 G/DL (ref 0.2–0.4)
ALPHA2 GLOB SERPL ELPH-MCNC: 0.6 G/DL (ref 0.5–0.9)
B-GLOBULIN SERPL ELPH-MCNC: 0.7 G/DL (ref 0.6–1)
BKR LAB AP GYN ADEQUACY: NORMAL
BKR LAB AP GYN INTERPRETATION: NORMAL
BKR LAB AP HPV REFLEX: NORMAL
BKR LAB AP PREVIOUS ABNORMAL: NORMAL
GAMMA GLOB SERPL ELPH-MCNC: 0.9 G/DL (ref 0.7–1.6)
M PROTEIN SERPL ELPH-MCNC: 0.2 G/DL
PATH REPORT.COMMENTS IMP SPEC: NORMAL
PATH REPORT.COMMENTS IMP SPEC: NORMAL
PATH REPORT.RELEVANT HX SPEC: NORMAL
PROT PATTERN SERPL ELPH-IMP: ABNORMAL

## 2022-05-17 LAB
HUMAN PAPILLOMA VIRUS 16 DNA: NEGATIVE
HUMAN PAPILLOMA VIRUS 18 DNA: NEGATIVE
HUMAN PAPILLOMA VIRUS FINAL DIAGNOSIS: NORMAL
HUMAN PAPILLOMA VIRUS OTHER HR: NEGATIVE

## 2022-05-25 ENCOUNTER — HOSPITAL ENCOUNTER (OUTPATIENT)
Dept: MAMMOGRAPHY | Facility: CLINIC | Age: 62
Discharge: HOME | End: 2022-05-25
Attending: FAMILY MEDICINE | Admitting: FAMILY MEDICINE
Payer: COMMERCIAL

## 2022-05-25 DIAGNOSIS — Z12.31 VISIT FOR SCREENING MAMMOGRAM: ICD-10-CM

## 2022-05-25 PROCEDURE — 77067 SCR MAMMO BI INCL CAD: CPT

## 2022-08-16 ENCOUNTER — OFFICE VISIT (OUTPATIENT)
Dept: NEUROSURGERY | Facility: CLINIC | Age: 62
End: 2022-08-16
Payer: COMMERCIAL

## 2022-08-16 VITALS
WEIGHT: 133.6 LBS | SYSTOLIC BLOOD PRESSURE: 106 MMHG | HEIGHT: 65 IN | HEART RATE: 50 BPM | DIASTOLIC BLOOD PRESSURE: 70 MMHG | OXYGEN SATURATION: 100 % | BODY MASS INDEX: 22.26 KG/M2

## 2022-08-16 DIAGNOSIS — M85.9 LOW BONE DENSITY: ICD-10-CM

## 2022-08-16 DIAGNOSIS — M54.50 ACUTE MIDLINE LOW BACK PAIN WITHOUT SCIATICA: Primary | ICD-10-CM

## 2022-08-16 PROCEDURE — 99203 OFFICE O/P NEW LOW 30 MIN: CPT | Performed by: PHYSICIAN ASSISTANT

## 2022-08-16 NOTE — NURSING NOTE
"Yue Case is a 62 year old female who presents for:  Chief Complaint   Patient presents with     Back Pain     Lower back pain        Initial Vitals:  /70   Pulse 50   Ht 5' 5\" (1.651 m)   Wt 133 lb 9.6 oz (60.6 kg)   SpO2 100%   BMI 22.23 kg/m   Estimated body mass index is 22.23 kg/m  as calculated from the following:    Height as of this encounter: 5' 5\" (1.651 m).    Weight as of this encounter: 133 lb 9.6 oz (60.6 kg).. Body surface area is 1.67 meters squared. BP completed using cuff size: small regular  Data Unavailable        Fadumo Whittaker  "

## 2022-08-16 NOTE — PROGRESS NOTES
Neurosurgery Consult    HPI    Ms. Case is a 62-year-old female who presents to clinic with 1 week history of midline back pain.  It began after she tried to lift up a couch.  She heard a crack and snap in her back and had intense pain.  She denies any radicular symptoms weakness or numbness in her lower extremities bowel or bladder symptoms.  She then fell a couple days later when she tripped over a open drawer and tumbled forward.  She states the pain is most bothersome at night when she is lying down and if she bends and twists.  She can stand and walk without issue.  The pain is gradually improving, but it is still bothersome.    Medical history  MGUS  Low bone density  History of basal cell carcinoma    Social history  Retired from the [employer] industry      B/P: 106/70, T: Data Unavailable, P: 50, R: Data Unavailable       Exam    Alert and oriented no acute distress  Bilateral lower extremities with 5/5 strength  Reflexes 2+ patella/ankle  Negative straight leg raise bilaterally  Negative ankle clonus negative Babinski bilaterally  Lumbar spine nontender to palpation  Able to stand on heels and toes  Gait is normal    Imaging    No imaging to review    Assessment    Acute back pain  Low bone density    Plan:      Recommend the patient obtain lumbar x-ray to rule out compression fracture.  I will contact her with the results when they are available.    Addendum 8/17/2022    Lumbar x-ray reviewed, no compression fractures or other fractures noted, normal alignment, facet arthropathy at L3-4, 4-5 and L5-S1.  Recommend the patient continue conservative therapies and follow-up as needed.    Total time of 30 minutes spent with the patient today in counseling and coordination of care.

## 2022-08-16 NOTE — LETTER
8/16/2022         RE: Yue Case  [address] [address] [facility] [id number]        Dear Colleague,    Thank you for referring your patient, Yue Case, to the [facility]. Please see a copy of my visit note below.    Neurosurgery Consult    HPI    Ms. Case is a 62-year-old female who presents to clinic with 1 week history of midline back pain.  It began after she tried to lift up a couch.  She heard a crack and snap in her back and had intense pain.  She denies any radicular symptoms weakness or numbness in her lower extremities bowel or bladder symptoms.  She then fell a couple days later when she tripped over a open drawer and tumbled forward.  She states the pain is most bothersome at night when she is lying down and if she bends and twists.  She can stand and walk without issue.  The pain is gradually improving, but it is still bothersome.    Medical history  MGUS  Low bone density  History of basal cell carcinoma    Social history  Retired from the finance industry      B/P: 106/70, T: Data Unavailable, P: 50, R: Data Unavailable       Exam    Alert and oriented no acute distress  Bilateral lower extremities with 5/5 strength  Reflexes 2+ patella/ankle  Negative straight leg raise bilaterally  Negative ankle clonus negative Babinski bilaterally  Lumbar spine nontender to palpation  Able to stand on heels and toes  Gait is normal    Imaging    No imaging to review    Assessment    Acute back pain  Low bone density    Plan:      Recommend the patient obtain lumbar MRI to rule out compression fracture.  I will contact her with the results when they are available.    Total time of 30 minutes spent with the patient today in counseling and coordination of care.      Again, thank you for allowing me to participate in the care of your patient.        Sincerely,        Pavan Flores PA-C

## 2022-11-16 ENCOUNTER — ANCILLARY PROCEDURE (OUTPATIENT)
Dept: BONE DENSITY | Facility: CLINIC | Age: 62
End: 2022-11-16
Attending: SPECIALIST
Payer: COMMERCIAL

## 2022-11-16 DIAGNOSIS — M81.0 OSTEOPOROSIS: ICD-10-CM

## 2022-11-16 PROCEDURE — 77080 DXA BONE DENSITY AXIAL: CPT | Performed by: INTERNAL MEDICINE

## 2023-03-28 ENCOUNTER — MYC MEDICAL ADVICE (OUTPATIENT)
Dept: NEUROSURGERY | Facility: CLINIC | Age: 63
End: 2023-03-28
Payer: COMMERCIAL

## 2023-04-13 ENCOUNTER — OFFICE VISIT (OUTPATIENT)
Dept: NEUROSURGERY | Facility: CLINIC | Age: 63
End: 2023-04-13
Attending: PHYSICIAN ASSISTANT
Payer: COMMERCIAL

## 2023-04-13 VITALS
BODY MASS INDEX: 22.26 KG/M2 | WEIGHT: 133.6 LBS | HEART RATE: 64 BPM | DIASTOLIC BLOOD PRESSURE: 75 MMHG | HEIGHT: 65 IN | OXYGEN SATURATION: 96 % | SYSTOLIC BLOOD PRESSURE: 116 MMHG

## 2023-04-13 DIAGNOSIS — M54.50 ACUTE MIDLINE LOW BACK PAIN WITHOUT SCIATICA: Primary | ICD-10-CM

## 2023-04-13 PROCEDURE — G0463 HOSPITAL OUTPT CLINIC VISIT: HCPCS

## 2023-04-13 PROCEDURE — G0463 HOSPITAL OUTPT CLINIC VISIT: HCPCS | Performed by: PHYSICIAN ASSISTANT

## 2023-04-13 PROCEDURE — 99213 OFFICE O/P EST LOW 20 MIN: CPT | Performed by: PHYSICIAN ASSISTANT

## 2023-04-13 RX ORDER — ERGOCALCIFEROL (VITAMIN D2) 10 MCG
400 TABLET ORAL
COMMUNITY
End: 2023-11-15

## 2023-04-13 ASSESSMENT — PAIN SCALES - GENERAL: PAINLEVEL: SEVERE PAIN (6)

## 2023-04-13 NOTE — LETTER
4/13/2023         RE: Yue Case  [address] [address] [facility] [address] [facility] [id number]        Dear Colleague,    Thank you for referring your patient, Yue Case, to the [facility]UR[facility] [place]. Please see a copy of my visit note below.    Neurosurgery follow-up    Ms. Case is a 62-year-old female who returns to clinic for persistent intermittent left-sided low paraspinal back pain that happens only with extension.  It began in August 2020 when she lifted a couch.  It has been intermittently bothersome particularly with certain movements and involve extension.  She has not yet tried any physical therapy or chiropractic or acupuncture.  She has not had any other imaging other than her x-ray that she had initially which did not demonstrate any compression fractures, and showed normal alignment with facet arthropathy at L3-4 L4-5 and L5-S1.     She denies any radicular symptoms.    Exam    B/P: 116/75, T: Data Unavailable, P: 64, R: Data Unavailable     Alert and oriented no acute distress  Lumbar spine nontender to palpation in the midline or in the left paraspinal muscles.  Bilateral lower extremities with appropriate strength  Gait is normal    Imaging    See HPI    Assessment    Left-sided low back pain with extension      Plan    I recommend the patient begin with a trial of physical therapy and chiropractic, if this does not help we might recommend further imaging such as a flexion-extension x-ray or lumbar MRI.  She will follow-up with us if she is not improving.              Again, thank you for allowing me to participate in the care of your patient.        Sincerely,        Pvaan Flores PA-C

## 2023-04-13 NOTE — NURSING NOTE
"Yue Case is a 62 year old female who presents for:  Chief Complaint   Patient presents with     RECHECK     Acute mid low back pain without sciatica         Initial Vitals:  /75   Pulse 64   Ht 5' 5\" (1.651 m)   Wt 133 lb 9.6 oz (60.6 kg)   SpO2 96%   BMI 22.23 kg/m   Estimated body mass index is 22.23 kg/m  as calculated from the following:    Height as of this encounter: 5' 5\" (1.651 m).    Weight as of this encounter: 133 lb 9.6 oz (60.6 kg).. Body surface area is 1.67 meters squared. BP completed using cuff size: small regular  Severe Pain (6)      Fadumo Whittaker  "

## 2023-04-13 NOTE — PROGRESS NOTES
Neurosurgery follow-up    Ms. Case is a 62-year-old female who returns to clinic for persistent intermittent left-sided low paraspinal back pain that happens only with extension.  It began in August 2020 when she lifted a couch.  It has been intermittently bothersome particularly with certain movements and involve extension.  She has not yet tried any physical therapy or chiropractic or acupuncture.  She has not had any other imaging other than her x-ray that she had initially which did not demonstrate any compression fractures, and showed normal alignment with facet arthropathy at L3-4 L4-5 and L5-S1.     She denies any radicular symptoms.    Exam    B/P: 116/75, T: Data Unavailable, P: 64, R: Data Unavailable     Alert and oriented no acute distress  Lumbar spine nontender to palpation in the midline or in the left paraspinal muscles.  Bilateral lower extremities with appropriate strength  Gait is normal    Imaging    See HPI    Assessment    Left-sided low back pain with extension      Plan    I recommend the patient begin with a trial of physical therapy and chiropractic, if this does not help we might recommend further imaging such as a flexion-extension x-ray or lumbar MRI.  She will follow-up with us if she is not improving.

## 2023-04-20 ENCOUNTER — PATIENT OUTREACH (OUTPATIENT)
Dept: CARE COORDINATION | Facility: CLINIC | Age: 63
End: 2023-04-20
Payer: COMMERCIAL

## 2023-05-04 ENCOUNTER — PATIENT OUTREACH (OUTPATIENT)
Dept: CARE COORDINATION | Facility: CLINIC | Age: 63
End: 2023-05-04
Payer: COMMERCIAL

## 2023-06-03 ENCOUNTER — ANCILLARY PROCEDURE (OUTPATIENT)
Dept: MAMMOGRAPHY | Facility: CLINIC | Age: 63
End: 2023-06-03
Attending: FAMILY MEDICINE
Payer: COMMERCIAL

## 2023-06-03 DIAGNOSIS — Z12.31 VISIT FOR SCREENING MAMMOGRAM: ICD-10-CM

## 2023-06-03 PROCEDURE — 77063 BREAST TOMOSYNTHESIS BI: CPT | Mod: TC | Performed by: RADIOLOGY

## 2023-06-03 PROCEDURE — 77067 SCR MAMMO BI INCL CAD: CPT | Mod: TC | Performed by: RADIOLOGY

## 2023-06-08 ENCOUNTER — TRANSFERRED RECORDS (OUTPATIENT)
Dept: HEALTH INFORMATION MANAGEMENT | Facility: CLINIC | Age: 63
End: 2023-06-08

## 2023-06-08 LAB
CREATININE (EXTERNAL): 0.76 MG/DL (ref 0.52–1.04)
GFR ESTIMATED (EXTERNAL): >60 ML/MIN/1.7
GLUCOSE (EXTERNAL): 114 MG/DL (ref 70–99)
POTASSIUM (EXTERNAL): 4.2 MMOL/L (ref 3.5–5.1)

## 2023-06-27 ENCOUNTER — MEDICAL CORRESPONDENCE (OUTPATIENT)
Dept: HEALTH INFORMATION MANAGEMENT | Facility: CLINIC | Age: 63
End: 2023-06-27
Payer: COMMERCIAL

## 2023-07-02 ENCOUNTER — HEALTH MAINTENANCE LETTER (OUTPATIENT)
Age: 63
End: 2023-07-02

## 2023-07-06 ENCOUNTER — TRANSFERRED RECORDS (OUTPATIENT)
Dept: HEALTH INFORMATION MANAGEMENT | Facility: CLINIC | Age: 63
End: 2023-07-06
Payer: COMMERCIAL

## 2023-07-13 PROBLEM — M81.0 SENILE OSTEOPOROSIS: Status: ACTIVE | Noted: 2023-07-13

## 2023-07-13 RX ORDER — METHYLPREDNISOLONE SODIUM SUCCINATE 125 MG/2ML
125 INJECTION, POWDER, LYOPHILIZED, FOR SOLUTION INTRAMUSCULAR; INTRAVENOUS
Status: CANCELLED
Start: 2023-08-01

## 2023-07-13 RX ORDER — EPINEPHRINE 1 MG/ML
0.3 INJECTION, SOLUTION INTRAMUSCULAR; SUBCUTANEOUS EVERY 5 MIN PRN
Status: CANCELLED | OUTPATIENT
Start: 2023-08-01

## 2023-07-13 RX ORDER — ALBUTEROL SULFATE 0.83 MG/ML
2.5 SOLUTION RESPIRATORY (INHALATION)
Status: CANCELLED | OUTPATIENT
Start: 2023-08-01

## 2023-07-13 RX ORDER — MEPERIDINE HYDROCHLORIDE 25 MG/ML
25 INJECTION INTRAMUSCULAR; INTRAVENOUS; SUBCUTANEOUS EVERY 30 MIN PRN
Status: CANCELLED | OUTPATIENT
Start: 2023-08-01

## 2023-07-13 RX ORDER — ALBUTEROL SULFATE 90 UG/1
1-2 AEROSOL, METERED RESPIRATORY (INHALATION)
Status: CANCELLED
Start: 2023-08-01

## 2023-07-13 RX ORDER — DIPHENHYDRAMINE HYDROCHLORIDE 50 MG/ML
50 INJECTION INTRAMUSCULAR; INTRAVENOUS
Status: CANCELLED
Start: 2023-08-01

## 2023-08-01 ENCOUNTER — INFUSION THERAPY VISIT (OUTPATIENT)
Dept: INFUSION THERAPY | Facility: CLINIC | Age: 63
End: 2023-08-01
Attending: SPECIALIST
Payer: COMMERCIAL

## 2023-08-01 VITALS — HEART RATE: 55 BPM | SYSTOLIC BLOOD PRESSURE: 110 MMHG | DIASTOLIC BLOOD PRESSURE: 66 MMHG | OXYGEN SATURATION: 98 %

## 2023-08-01 DIAGNOSIS — M81.0 SENILE OSTEOPOROSIS: Primary | ICD-10-CM

## 2023-08-01 PROCEDURE — 250N000011 HC RX IP 250 OP 636: Mod: JZ | Performed by: SPECIALIST

## 2023-08-01 PROCEDURE — 96372 THER/PROPH/DIAG INJ SC/IM: CPT | Performed by: SPECIALIST

## 2023-08-01 RX ORDER — ALBUTEROL SULFATE 0.83 MG/ML
2.5 SOLUTION RESPIRATORY (INHALATION)
Status: CANCELLED | OUTPATIENT
Start: 2024-01-28

## 2023-08-01 RX ORDER — DIPHENHYDRAMINE HYDROCHLORIDE 50 MG/ML
50 INJECTION INTRAMUSCULAR; INTRAVENOUS
Status: CANCELLED
Start: 2024-01-28

## 2023-08-01 RX ORDER — EPINEPHRINE 1 MG/ML
0.3 INJECTION, SOLUTION INTRAMUSCULAR; SUBCUTANEOUS EVERY 5 MIN PRN
Status: CANCELLED | OUTPATIENT
Start: 2024-01-28

## 2023-08-01 RX ORDER — MEPERIDINE HYDROCHLORIDE 25 MG/ML
25 INJECTION INTRAMUSCULAR; INTRAVENOUS; SUBCUTANEOUS EVERY 30 MIN PRN
Status: CANCELLED | OUTPATIENT
Start: 2024-01-28

## 2023-08-01 RX ORDER — ALBUTEROL SULFATE 90 UG/1
1-2 AEROSOL, METERED RESPIRATORY (INHALATION)
Status: CANCELLED
Start: 2024-01-28

## 2023-08-01 RX ORDER — METHYLPREDNISOLONE SODIUM SUCCINATE 125 MG/2ML
125 INJECTION, POWDER, LYOPHILIZED, FOR SOLUTION INTRAMUSCULAR; INTRAVENOUS
Status: CANCELLED
Start: 2024-01-28

## 2023-08-01 RX ADMIN — DENOSUMAB 60 MG: 60 INJECTION SUBCUTANEOUS at 09:29

## 2023-08-01 NOTE — PROGRESS NOTES
Infusion Nursing Note:  Yue Case presents today for Prolia.    Patient seen by provider today: No   present during visit today: Not Applicable.    Note: Pt has been receiving Prolia injections since 2015 at her endocrinology clinic. Will receive here now due to insurance changes    Pt confirms she is taking Ca/Vit D as prescribed. Denies any upcoming invasive dental procedures. Denies any recent fractures      Intravenous Access:  No Intravenous access/labs at this visit.    Treatment Conditions: Labs noted 6/8/23  Results reviewed, labs MET treatment parameters, ok to proceed with treatment.  Ca 9.3  Creat 0.76      Post Infusion Assessment:  Patient tolerated injection without incident.       Discharge Plan:   Patient will return in 6 months for next Prolia injection; will schedule this at her earliest convenience  Patient discharged in stable condition accompanied by: self.  Departure Mode: Ambulatory.      Maria Ferannda Henderson RN

## 2023-11-14 SDOH — HEALTH STABILITY: PHYSICAL HEALTH: ON AVERAGE, HOW MANY DAYS PER WEEK DO YOU ENGAGE IN MODERATE TO STRENUOUS EXERCISE (LIKE A BRISK WALK)?: 5 DAYS

## 2023-11-14 SDOH — HEALTH STABILITY: PHYSICAL HEALTH: ON AVERAGE, HOW MANY MINUTES DO YOU ENGAGE IN EXERCISE AT THIS LEVEL?: 60 MIN

## 2023-11-14 ASSESSMENT — ANXIETY QUESTIONNAIRES
GAD7 TOTAL SCORE: 0
5. BEING SO RESTLESS THAT IT IS HARD TO SIT STILL: NOT AT ALL
GAD7 TOTAL SCORE: 0
7. FEELING AFRAID AS IF SOMETHING AWFUL MIGHT HAPPEN: NOT AT ALL
1. FEELING NERVOUS, ANXIOUS, OR ON EDGE: NOT AT ALL
4. TROUBLE RELAXING: NOT AT ALL
3. WORRYING TOO MUCH ABOUT DIFFERENT THINGS: NOT AT ALL
2. NOT BEING ABLE TO STOP OR CONTROL WORRYING: NOT AT ALL
6. BECOMING EASILY ANNOYED OR IRRITABLE: NOT AT ALL

## 2023-11-14 ASSESSMENT — LIFESTYLE VARIABLES
HOW OFTEN DO YOU HAVE SIX OR MORE DRINKS ON ONE OCCASION: NEVER
HOW OFTEN DO YOU HAVE A DRINK CONTAINING ALCOHOL: 2-4 TIMES A MONTH
HOW MANY STANDARD DRINKS CONTAINING ALCOHOL DO YOU HAVE ON A TYPICAL DAY: 1 OR 2
AUDIT-C TOTAL SCORE: 2
SKIP TO QUESTIONS 9-10: 1

## 2023-11-14 ASSESSMENT — ENCOUNTER SYMPTOMS
HEMATURIA: 0
FREQUENCY: 0
HEMATOCHEZIA: 0
ABDOMINAL PAIN: 0
SHORTNESS OF BREATH: 0
FEVER: 0
NERVOUS/ANXIOUS: 0
CHILLS: 0
NAUSEA: 0
COUGH: 0
JOINT SWELLING: 0
HEARTBURN: 0
SORE THROAT: 0
PARESTHESIAS: 0
BREAST MASS: 0
DYSURIA: 0
MYALGIAS: 0
ARTHRALGIAS: 1
DIARRHEA: 0
DIZZINESS: 0
HEADACHES: 0
WEAKNESS: 0
PALPITATIONS: 0
EYE PAIN: 0
CONSTIPATION: 0

## 2023-11-14 ASSESSMENT — SOCIAL DETERMINANTS OF HEALTH (SDOH)
IN A TYPICAL WEEK, HOW MANY TIMES DO YOU TALK ON THE PHONE WITH FAMILY, FRIENDS, OR NEIGHBORS?: TWICE A WEEK
HOW OFTEN DO YOU GET TOGETHER WITH FRIENDS OR RELATIVES?: ONCE A WEEK
HOW OFTEN DO YOU ATTEND CHURCH OR RELIGIOUS SERVICES?: 1 TO 4 TIMES PER YEAR
DO YOU BELONG TO ANY CLUBS OR ORGANIZATIONS SUCH AS CHURCH GROUPS UNIONS, FRATERNAL OR ATHLETIC GROUPS, OR SCHOOL GROUPS?: NO

## 2023-11-14 ASSESSMENT — PATIENT HEALTH QUESTIONNAIRE - PHQ9
SUM OF ALL RESPONSES TO PHQ QUESTIONS 1-9: 0
SUM OF ALL RESPONSES TO PHQ QUESTIONS 1-9: 0

## 2023-11-15 ENCOUNTER — OFFICE VISIT (OUTPATIENT)
Dept: FAMILY MEDICINE | Facility: CLINIC | Age: 63
End: 2023-11-15
Payer: COMMERCIAL

## 2023-11-15 VITALS
RESPIRATION RATE: 18 BRPM | BODY MASS INDEX: 24.46 KG/M2 | DIASTOLIC BLOOD PRESSURE: 58 MMHG | HEART RATE: 55 BPM | HEIGHT: 65 IN | SYSTOLIC BLOOD PRESSURE: 104 MMHG | OXYGEN SATURATION: 98 % | WEIGHT: 146.8 LBS | TEMPERATURE: 97.7 F

## 2023-11-15 DIAGNOSIS — R00.1 BRADYCARDIA: Primary | ICD-10-CM

## 2023-11-15 PROCEDURE — 90715 TDAP VACCINE 7 YRS/> IM: CPT | Performed by: NURSE PRACTITIONER

## 2023-11-15 PROCEDURE — 99213 OFFICE O/P EST LOW 20 MIN: CPT | Mod: 25 | Performed by: NURSE PRACTITIONER

## 2023-11-15 PROCEDURE — 93000 ELECTROCARDIOGRAM COMPLETE: CPT | Performed by: NURSE PRACTITIONER

## 2023-11-15 PROCEDURE — 90471 IMMUNIZATION ADMIN: CPT | Performed by: NURSE PRACTITIONER

## 2023-11-15 PROCEDURE — 99396 PREV VISIT EST AGE 40-64: CPT | Mod: 25 | Performed by: NURSE PRACTITIONER

## 2023-11-15 RX ORDER — CALCIUM CARB/VITAMIN D3/VIT K1 650MG-12.5
TABLET,CHEWABLE ORAL
COMMUNITY

## 2023-11-15 RX ORDER — BETAMETHASONE DIPROPIONATE 0.5 MG/G
LOTION TOPICAL
COMMUNITY
Start: 2023-07-12

## 2023-11-15 ASSESSMENT — ENCOUNTER SYMPTOMS
CONSTIPATION: 0
FEVER: 0
NAUSEA: 0
PALPITATIONS: 0
BREAST MASS: 0
ARTHRALGIAS: 1
MYALGIAS: 0
HEARTBURN: 0
SHORTNESS OF BREATH: 0
EYE PAIN: 0
HEMATURIA: 0
DIZZINESS: 0
JOINT SWELLING: 0
NERVOUS/ANXIOUS: 0
ABDOMINAL PAIN: 0
CHILLS: 0
SORE THROAT: 0
PARESTHESIAS: 0
HEMATOCHEZIA: 0
WEAKNESS: 0
HEADACHES: 0
DIARRHEA: 0
COUGH: 0
DYSURIA: 0
FREQUENCY: 0

## 2023-11-15 ASSESSMENT — PAIN SCALES - GENERAL: PAINLEVEL: NO PAIN (0)

## 2023-11-21 ENCOUNTER — HOSPITAL ENCOUNTER (OUTPATIENT)
Dept: CARDIOLOGY | Facility: CLINIC | Age: 63
Discharge: HOME | End: 2023-11-21
Attending: NURSE PRACTITIONER | Admitting: NURSE PRACTITIONER
Payer: COMMERCIAL

## 2023-11-21 DIAGNOSIS — R00.1 BRADYCARDIA: ICD-10-CM

## 2023-11-21 PROCEDURE — 93248 EXT ECG>7D<15D REV&INTERPJ: CPT | Performed by: INTERNAL MEDICINE

## 2023-11-21 PROCEDURE — 93246 EXT ECG>7D<15D RECORDING: CPT

## 2024-01-29 ENCOUNTER — INFUSION THERAPY VISIT (OUTPATIENT)
Dept: INFUSION THERAPY | Facility: CLINIC | Age: 64
End: 2024-01-29
Attending: SPECIALIST
Payer: COMMERCIAL

## 2024-01-29 VITALS — DIASTOLIC BLOOD PRESSURE: 76 MMHG | TEMPERATURE: 125.6 F | SYSTOLIC BLOOD PRESSURE: 120 MMHG | OXYGEN SATURATION: 100 %

## 2024-01-29 DIAGNOSIS — M81.0 SENILE OSTEOPOROSIS: Primary | ICD-10-CM

## 2024-01-29 LAB
ALBUMIN SERPL BCG-MCNC: 4.3 G/DL (ref 3.5–5.2)
CALCIUM SERPL-MCNC: 9.1 MG/DL (ref 8.8–10.2)
CREAT SERPL-MCNC: 0.86 MG/DL (ref 0.51–0.95)
EGFRCR SERPLBLD CKD-EPI 2021: 75 ML/MIN/1.73M2

## 2024-01-29 PROCEDURE — 82040 ASSAY OF SERUM ALBUMIN: CPT | Performed by: SPECIALIST

## 2024-01-29 PROCEDURE — 82565 ASSAY OF CREATININE: CPT

## 2024-01-29 PROCEDURE — 96372 THER/PROPH/DIAG INJ SC/IM: CPT | Performed by: SPECIALIST

## 2024-01-29 PROCEDURE — 36415 COLL VENOUS BLD VENIPUNCTURE: CPT | Performed by: SPECIALIST

## 2024-01-29 PROCEDURE — 82310 ASSAY OF CALCIUM: CPT

## 2024-01-29 PROCEDURE — 250N000011 HC RX IP 250 OP 636: Mod: JZ | Performed by: SPECIALIST

## 2024-01-29 RX ORDER — ALBUTEROL SULFATE 90 UG/1
1-2 AEROSOL, METERED RESPIRATORY (INHALATION)
Start: 2024-07-26

## 2024-01-29 RX ORDER — EPINEPHRINE 1 MG/ML
0.3 INJECTION, SOLUTION INTRAMUSCULAR; SUBCUTANEOUS EVERY 5 MIN PRN
OUTPATIENT
Start: 2024-07-26

## 2024-01-29 RX ORDER — MEPERIDINE HYDROCHLORIDE 25 MG/ML
25 INJECTION INTRAMUSCULAR; INTRAVENOUS; SUBCUTANEOUS EVERY 30 MIN PRN
OUTPATIENT
Start: 2024-07-26

## 2024-01-29 RX ORDER — ALBUTEROL SULFATE 0.83 MG/ML
2.5 SOLUTION RESPIRATORY (INHALATION)
OUTPATIENT
Start: 2024-07-26

## 2024-01-29 RX ORDER — DIPHENHYDRAMINE HYDROCHLORIDE 50 MG/ML
50 INJECTION INTRAMUSCULAR; INTRAVENOUS
Start: 2024-07-26

## 2024-01-29 RX ORDER — METHYLPREDNISOLONE SODIUM SUCCINATE 125 MG/2ML
125 INJECTION, POWDER, LYOPHILIZED, FOR SOLUTION INTRAMUSCULAR; INTRAVENOUS
Start: 2024-07-26

## 2024-01-29 RX ADMIN — DENOSUMAB 60 MG: 60 INJECTION SUBCUTANEOUS at 14:48

## 2024-01-29 NOTE — PROGRESS NOTES
Infusion Nursing Note:  Yue Case presents today for labs/Prolia.    Patient seen by provider today: No   present during visit today: Not Applicable.    Note: Pt verifies she is taking Ca/Vit D as prescribed. Denies recent/upcoming invasive dental procedures.      Intravenous Access:  Lab draw site LAC, Needle type butterfly, Gauge 23.  Labs drawn without difficulty.    Treatment Conditions:  Results reviewed, labs MET treatment parameters, ok to proceed with treatment.  Creat 0.86  Ca 9.1.      Post Infusion Assessment:  Patient tolerated injection without incident.  Site patent and intact, free from redness, edema or discomfort.       Discharge Plan:   [facility]  Patient will return in 6 months for next Prolia injection for next appointment.   Patient discharged in stable condition accompanied by: self.  Departure Mode: Ambulatory.      Maria Fernanda Henderson RN

## 2024-08-05 ENCOUNTER — OFFICE VISIT (OUTPATIENT)
Dept: ORTHOPEDICS | Facility: CLINIC | Age: 64
End: 2024-08-05
Payer: COMMERCIAL

## 2024-08-05 VITALS
DIASTOLIC BLOOD PRESSURE: 74 MMHG | WEIGHT: 148 LBS | HEIGHT: 65 IN | BODY MASS INDEX: 24.66 KG/M2 | SYSTOLIC BLOOD PRESSURE: 114 MMHG

## 2024-08-05 DIAGNOSIS — M65.342 TRIGGER RING FINGER OF LEFT HAND: Primary | ICD-10-CM

## 2024-08-05 PROCEDURE — 76942 ECHO GUIDE FOR BIOPSY: CPT | Performed by: STUDENT IN AN ORGANIZED HEALTH CARE EDUCATION/TRAINING PROGRAM

## 2024-08-05 PROCEDURE — 20550 NJX 1 TENDON SHEATH/LIGAMENT: CPT | Mod: F3 | Performed by: STUDENT IN AN ORGANIZED HEALTH CARE EDUCATION/TRAINING PROGRAM

## 2024-08-05 PROCEDURE — 99203 OFFICE O/P NEW LOW 30 MIN: CPT | Mod: 25 | Performed by: STUDENT IN AN ORGANIZED HEALTH CARE EDUCATION/TRAINING PROGRAM

## 2024-08-05 RX ORDER — BETAMETHASONE SODIUM PHOSPHATE AND BETAMETHASONE ACETATE 3; 3 MG/ML; MG/ML
6 INJECTION, SUSPENSION INTRA-ARTICULAR; INTRALESIONAL; INTRAMUSCULAR; SOFT TISSUE
Status: SHIPPED | OUTPATIENT
Start: 2024-08-05

## 2024-08-05 RX ADMIN — BETAMETHASONE SODIUM PHOSPHATE AND BETAMETHASONE ACETATE 6 MG: 3; 3 INJECTION, SUSPENSION INTRA-ARTICULAR; INTRALESIONAL; INTRAMUSCULAR; SOFT TISSUE at 15:22

## 2024-08-05 NOTE — LETTER
8/5/2024      Yue Case  [id number] [facility] [id number]      Dear Colleague,    Thank you for referring your patient, Yue Case, to the [facility]. Please see a copy of my visit note below.    ASSESSMENT & PLAN    Yue was seen today for pain.    Diagnoses and all orders for this visit:    Trigger ring finger of left hand    Other orders  -     Hand / Upper Extremity Injection/Arthrocentesis: L ring A1      This issue is acute and Unchanged. Yue presents our clinic today to discuss her pain and stiffness of the left ring finger.  Her history and exam findings today are consistent with a trigger finger of the left ring finger.  We discussed this problem and its treatment options including anti-inflammatory medicines, hand therapy, corticosteroid injections, and surgical intervention.  We discussed that given her symptoms mostly occur in the morning and are not consistent throughout the day, conservative management would likely be of more benefit than proceeding straight to surgical intervention, and the patient wished to proceed with conservative management at this time.  We discussed the role of corticosteroid injections to help reduce inflammation around the A1 pulley, and after this discussion the patient wished to proceed with this today.  We determined the following plan:  - CSI to the left ring finger A1 pulley performed today under ultrasound guidance, see procedure note below for details  - She can otherwise use over-the-counter pain medicines, ice, heat as needed  - She can follow-up in our clinic as needed      Min Rico,   [facility]    -----  Chief Complaint   Patient presents with     Left Ring Finger - Pain       SUBJECTIVE  Yue Case is a/an 64 year old female who is seen as a self referral for evaluation of left ring trigger finger.     The patient is seen by themselves.  The patient is  "Ambidextrous handed    Onset: 2 month(s) ago. Reports insidious onset without acute precipitating event.  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  CV: distal perfusion intact   Resp: normal respiratory effort without conversational dyspnea   Psych: normal mood and affect  Gait: NORMAL  Neuro: Normal light sensory exam of MABEL extremity     Hand Exam    Left  Right   Inspection No atrophy, erythema , echymosis, or deformity  No atrophy, erythema , echymosis, or deformity    Palpation         Tenderness over    No tenderness to palpation of radial styloid, DRUJ, TFCC, scaphoid/snuffbox   No tenderness to palpation of radial styloid, DRUJ, TFCC, scaphoid/snuffbox   Range of Motion        1st digit IP flex/ext Normal Normal      DIP flexion/extension  Normal 2nd-5th Normal 2nd-5th      PIP flexion/extension  Normal 2nd-5th Normal 2nd-5th      MCP flexion/extension Normal Normal   Strength      1st digit IP flex/ext Full Full    DIP flexion/extension Full Full    PIP flexion/extension  Full Full    MCP flexion/extension Full Full   Pain with resisted None None   Vascular   Intact  Intact    Special Tests   1st MCP valgus 0/30 normal   Neg CMC grind  Triggering of left ring finger   Able to make 'OK' sign (AIN)  Intact resisted abduction of digits    Sensation Intact to median, ulnar, and radial nerve distributions          RADIOLOGY:  No new imaging taken in clinic today    Hand / Upper Extremity Injection/Arthrocentesis: L ring " A1    Date/Time: 8/5/2024 3:22 PM    Performed by: Min Rico DO  Authorized by: Min Rico DO    Indications:  Pain, tendon swelling and therapeutic  Needle Size:  25 G  Guidance: ultrasound    Approach:  Volar  Condition: trigger finger    Location:  Ring finger    Site:  L ring A1  Medications:  6 mg betamethasone acet & sod phos 6 (3-3) MG/ML  Medications comment:  1ml Ropivacaine 0.5%  NDC: 39387-385-14  Lot: [id number]  Exp: 02/28/25  Outcome:  Tolerated well, no immediate complications  Procedure discussed: discussed risks, benefits, and alternatives    Consent Given by:  Patient  Timeout: timeout called immediately prior to procedure    Prep: patient was prepped and draped in usual sterile fashion     Ultrasound was used to ensure safe and accurate needle placement and injection. Ultrasound images of the procedure were permanently stored.               Again, thank you for allowing me to participate in the care of your patient.        Sincerely,        Min Rico DO

## 2024-08-05 NOTE — PROGRESS NOTES
ASSESSMENT & PLAN    Yue was seen today for pain.    Diagnoses and all orders for this visit:    Trigger ring finger of left hand    Other orders  -     Hand / Upper Extremity Injection/Arthrocentesis: L ring A1      This issue is acute and Unchanged. Yue presents our clinic today to discuss her pain and stiffness of the left ring finger.  Her history and exam findings today are consistent with a trigger finger of the left ring finger.  We discussed this problem and its treatment options including anti-inflammatory medicines, hand therapy, corticosteroid injections, and surgical intervention.  We discussed that given her symptoms mostly occur in the morning and are not consistent throughout the day, conservative management would likely be of more benefit than proceeding straight to surgical intervention, and the patient wished to proceed with conservative management at this time.  We discussed the role of corticosteroid injections to help reduce inflammation around the A1 pulley, and after this discussion the patient wished to proceed with this today.  We determined the following plan:  - CSI to the left ring finger A1 pulley performed today under ultrasound guidance, see procedure note below for details  - She can otherwise use over-the-counter pain medicines, ice, heat as needed  - She can follow-up in our clinic as needed      Min Rico, [facility] [place]    -----  Chief Complaint   Patient presents with    Left Ring Finger - Pain       SUBJECTIVE  Yue Case is a/an 64 year old female who is seen as a self referral for evaluation of left ring trigger finger.     The patient is seen by themselves.  The patient is Ambidextrous handed    Onset: 2 month(s) ago. Reports insidious onset without acute precipitating event.  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  CV: distal perfusion intact   Resp: normal respiratory effort without conversational dyspnea   Psych: normal mood and affect  Gait: NORMAL  Neuro: Normal light sensory exam of MABEL extremity     Hand Exam    Left  Right   Inspection No atrophy, erythema , echymosis, or deformity  No atrophy, erythema , echymosis, or deformity    Palpation         Tenderness over    No tenderness to palpation of radial styloid, DRUJ, TFCC, scaphoid/snuffbox   No tenderness to palpation of radial styloid, DRUJ, TFCC, scaphoid/snuffbox   Range of Motion        1st digit IP flex/ext Normal Normal      DIP flexion/extension  Normal 2nd-5th Normal 2nd-5th      PIP flexion/extension  Normal 2nd-5th Normal 2nd-5th      MCP flexion/extension Normal Normal   Strength      1st digit IP flex/ext Full Full    DIP flexion/extension Full Full    PIP flexion/extension  Full Full    MCP flexion/extension Full Full   Pain with resisted None None   Vascular   Intact  Intact    Special Tests   1st MCP valgus 0/30 normal   Neg CMC grind  Triggering of left ring finger   Able to make 'OK' sign (AIN)  Intact resisted abduction of digits    Sensation Intact to median, ulnar, and radial nerve distributions          RADIOLOGY:  No new imaging taken in clinic today    Hand / Upper Extremity Injection/Arthrocentesis: L ring A1    Date/Time: 8/5/2024 3:22 PM    Performed by: Min Rico DO  Authorized by: Min Rico DO    Indications:  Pain, tendon swelling and therapeutic  Needle Size:  25 G  Guidance: ultrasound    Approach:  Volar  Condition: trigger finger  "   Location:  Ring finger    Site:  L ring A1  Medications:  6 mg betamethasone acet & sod phos 6 (3-3) MG/ML  Medications comment:  1ml Ropivacaine 0.5%  NDC: 02541-715-11  Lot: [id number]  Exp: 02/28/25  Outcome:  Tolerated well, no immediate complications  Procedure discussed: discussed risks, benefits, and alternatives    Consent Given by:  Patient  Timeout: timeout called immediately prior to procedure    Prep: patient was prepped and draped in usual sterile fashion     Ultrasound was used to ensure safe and accurate needle placement and injection. Ultrasound images of the procedure were permanently stored.

## 2024-10-16 ENCOUNTER — PATIENT OUTREACH (OUTPATIENT)
Dept: CARE COORDINATION | Facility: CLINIC | Age: 64
End: 2024-10-16
Payer: COMMERCIAL

## 2024-10-30 ENCOUNTER — PATIENT OUTREACH (OUTPATIENT)
Dept: CARE COORDINATION | Facility: CLINIC | Age: 64
End: 2024-10-30
Payer: COMMERCIAL

## 2024-12-11 ENCOUNTER — OFFICE VISIT (OUTPATIENT)
Dept: URGENT CARE | Facility: URGENT CARE | Age: 64
End: 2024-12-11
Payer: COMMERCIAL

## 2024-12-11 VITALS
TEMPERATURE: 97.9 F | DIASTOLIC BLOOD PRESSURE: 72 MMHG | RESPIRATION RATE: 16 BRPM | BODY MASS INDEX: 24.63 KG/M2 | SYSTOLIC BLOOD PRESSURE: 112 MMHG | OXYGEN SATURATION: 99 % | HEART RATE: 69 BPM | WEIGHT: 148 LBS

## 2024-12-11 DIAGNOSIS — H10.9 BACTERIAL CONJUNCTIVITIS OF LEFT EYE: ICD-10-CM

## 2024-12-11 DIAGNOSIS — J06.9 VIRAL UPPER RESPIRATORY TRACT INFECTION WITH COUGH: Primary | ICD-10-CM

## 2024-12-11 PROCEDURE — 99213 OFFICE O/P EST LOW 20 MIN: CPT | Performed by: PHYSICIAN ASSISTANT

## 2024-12-11 RX ORDER — POLYMYXIN B SULFATE AND TRIMETHOPRIM 1; 10000 MG/ML; [USP'U]/ML
1-2 SOLUTION OPHTHALMIC EVERY 4 HOURS
Qty: 10 ML | Refills: 0 | Status: SHIPPED | OUTPATIENT
Start: 2024-12-11 | End: 2024-12-18

## 2024-12-11 NOTE — PROGRESS NOTES
URGENT CARE VISIT:    SUBJECTIVE:   Yue Case is a 64 year old female presenting with a chief complaint of stuffy nose, cough dry, and left eye mattering. Eye symptoms started a day ago.  Onset was 10 day(s) ago.   She denies the following symptoms: shortness of breath and facial pain/pressure  Course of illness is same.    Treatment measures tried include None tried with no relief of symptoms.  Predisposing factors include None.    PMH:   Past Medical History:   Diagnosis Date    Basal cell carcinoma     none    Osteopenia     2004 -2improved on therapy to -1.4sp,-1.6hip in 2007 astopped mesds    Osteoporosis     post menopausal on HRT     Allergies: Patient has no known allergies.   Medications:   Current Outpatient Medications   Medication Sig Dispense Refill    betamethasone dipropionate (DIPROSONE) 0.05 % external lotion APPLY TO SCALP AREAS TWICE A DAY AS NEEDED FOR FLARES      Calcium-Vitamin D-Vitamin K (VIACTIV CALCIUM PLUS D) 650-12.5-40 MG-MCG-MCG CHEW chew tablet       Denosumab (PROLIA SC)       polymixin b-trimethoprim (POLYTRIM) 86484-1.1 UNIT/ML-% ophthalmic solution Place 1-2 drops Into the left eye every 4 hours for 7 days. 10 mL 0     Social History:   Social History     Tobacco Use    Smoking status: Never     Passive exposure: Never    Smokeless tobacco: Never   Substance Use Topics    Alcohol use: Not Currently     Comment: occ       ROS:  Review of systems negative except as stated above.    OBJECTIVE:  /72   Pulse 69   Temp 97.9  F (36.6  C) (Tympanic)   Resp 16   Wt 67.1 kg (148 lb)   SpO2 99%   BMI 24.63 kg/m    GENERAL APPEARANCE: healthy, alert and no distress  EYES: EOMI,  PERRL, conjunctiva clear on right. Left conjunctival injection with mattering.   HENT: ear canals and TM's normal.  Nose and mouth without ulcers, erythema or lesions  NECK: supple, nontender, no lymphadenopathy  RESP: lungs clear to auscultation - no rales, rhonchi or wheezes  CV: regular rates and  rhythm, normal S1 S2, no murmur noted  SKIN: no suspicious lesions or rashes      ASSESSMENT:    ICD-10-CM    1. Viral upper respiratory tract infection with cough  J06.9       2. Bacterial conjunctivitis of left eye  H10.9 polymixin b-trimethoprim (POLYTRIM) 46778-8.1 UNIT/ML-% ophthalmic solution          PLAN:  Patient Instructions   Patient was educated on the natural course of viral illness which typically lasts up to 10 days.  Take medication as prescribed for bacterial conjunctivitis. Conservative measures discussed including increased fluids, nasal saline irrigation (neti pot), warm steamy shower, salt water gargles, Flonase nasal spray, Afrin nasal spray for 3 days, expectorants (Mucinex), and analgesics (Tylenol and/or Ibuprofen). See your primary care provider if symptoms worsen or do not improve in 7 days. Seek emergency care if you develop fever over 104 or shortness of breath.  Patient verbalized understanding and is agreeable to plan. The patient was discharged ambulatory and in stable condition.    Pamela Stoner PA-C ....................  12/11/2024   2:19 PM

## 2024-12-11 NOTE — PATIENT INSTRUCTIONS
Patient was educated on the natural course of viral illness which typically lasts up to 10 days.  Take medication as prescribed for bacterial conjunctivitis. Conservative measures discussed including increased fluids, nasal saline irrigation (neti pot), warm steamy shower, salt water gargles, Flonase nasal spray, Afrin nasal spray for 3 days, expectorants (Mucinex), and analgesics (Tylenol and/or Ibuprofen). See your primary care provider if symptoms worsen or do not improve in 7 days. Seek emergency care if you develop fever over 104 or shortness of breath.

## 2024-12-29 ENCOUNTER — HEALTH MAINTENANCE LETTER (OUTPATIENT)
Age: 64
End: 2024-12-29

## 2025-01-07 ENCOUNTER — TELEPHONE (OUTPATIENT)
Dept: FAMILY MEDICINE | Facility: CLINIC | Age: 65
End: 2025-01-07
Payer: COMMERCIAL

## 2025-01-07 NOTE — TELEPHONE ENCOUNTER
Patient Quality Outreach    Patient is due for the following:   Physical Preventive Adult Physical to do PHQ2    Action(s) Taken:   Patient was scheduled for 4/8/25 730am    Type of outreach:    Phone, spoke to patient/parent.      Questions for provider review:    None           Beulah Coombs, CMA

## 2025-01-15 ENCOUNTER — APPOINTMENT (OUTPATIENT)
Age: 65
Setting detail: DERMATOLOGY
End: 2025-01-15

## 2025-01-15 DIAGNOSIS — Z87.2 PERSONAL HISTORY OF DISEASES OF THE SKIN AND SUBCUTANEOUS TISSUE: ICD-10-CM

## 2025-01-15 DIAGNOSIS — L82.1 OTHER SEBORRHEIC KERATOSIS: ICD-10-CM

## 2025-01-15 DIAGNOSIS — Z71.89 OTHER SPECIFIED COUNSELING: ICD-10-CM

## 2025-01-15 DIAGNOSIS — D18.0 HEMANGIOMA: ICD-10-CM

## 2025-01-15 DIAGNOSIS — L57.0 ACTINIC KERATOSIS: ICD-10-CM

## 2025-01-15 DIAGNOSIS — Z85.828 PERSONAL HISTORY OF OTHER MALIGNANT NEOPLASM OF SKIN: ICD-10-CM

## 2025-01-15 DIAGNOSIS — D22 MELANOCYTIC NEVI: ICD-10-CM

## 2025-01-15 DIAGNOSIS — D485 NEOPLASM OF UNCERTAIN BEHAVIOR OF SKIN: ICD-10-CM

## 2025-01-15 DIAGNOSIS — L21.8 OTHER SEBORRHEIC DERMATITIS: ICD-10-CM

## 2025-01-15 PROBLEM — D22.5 MELANOCYTIC NEVI OF TRUNK: Status: ACTIVE | Noted: 2025-01-15

## 2025-01-15 PROBLEM — D18.01 HEMANGIOMA OF SKIN AND SUBCUTANEOUS TISSUE: Status: ACTIVE | Noted: 2025-01-15

## 2025-01-15 PROBLEM — D48.5 NEOPLASM OF UNCERTAIN BEHAVIOR OF SKIN: Status: ACTIVE | Noted: 2025-01-15

## 2025-01-15 PROCEDURE — 11103 TANGNTL BX SKIN EA SEP/ADDL: CPT

## 2025-01-15 PROCEDURE — ? SUNSCREEN RECOMMENDATIONS

## 2025-01-15 PROCEDURE — ? PRESCRIPTION

## 2025-01-15 PROCEDURE — ? OBSERVATION

## 2025-01-15 PROCEDURE — ? COUNSELING

## 2025-01-15 PROCEDURE — ? BIOPSY BY SHAVE METHOD

## 2025-01-15 PROCEDURE — 11102 TANGNTL BX SKIN SINGLE LES: CPT

## 2025-01-15 PROCEDURE — 99214 OFFICE O/P EST MOD 30 MIN: CPT | Mod: 25

## 2025-01-15 PROCEDURE — ? PRESCRIPTION MEDICATION MANAGEMENT

## 2025-01-15 RX ORDER — BETAMETHASONE DIPROPIONATE 0.5 MG/ML
LOTION, AUGMENTED TOPICAL
Qty: 60 | Refills: 3 | Status: ERX | COMMUNITY
Start: 2025-01-15

## 2025-01-15 RX ADMIN — BETAMETHASONE DIPROPIONATE: 0.5 LOTION, AUGMENTED TOPICAL at 00:00

## 2025-01-15 ASSESSMENT — LOCATION SIMPLE DESCRIPTION DERM
LOCATION SIMPLE: CHEST
LOCATION SIMPLE: RIGHT LOWER BACK
LOCATION SIMPLE: RIGHT KNEE
LOCATION SIMPLE: RIGHT UPPER ARM
LOCATION SIMPLE: RIGHT SHOULDER
LOCATION SIMPLE: LEFT HAND
LOCATION SIMPLE: LEFT FOREHEAD
LOCATION SIMPLE: UPPER BACK
LOCATION SIMPLE: LEFT LIP
LOCATION SIMPLE: RIGHT HAND
LOCATION SIMPLE: POSTERIOR SCALP
LOCATION SIMPLE: ABDOMEN

## 2025-01-15 ASSESSMENT — LOCATION ZONE DERM
LOCATION ZONE: TRUNK
LOCATION ZONE: SCALP
LOCATION ZONE: LIP
LOCATION ZONE: ARM
LOCATION ZONE: LEG
LOCATION ZONE: HAND
LOCATION ZONE: FACE

## 2025-01-15 ASSESSMENT — LOCATION DETAILED DESCRIPTION DERM
LOCATION DETAILED: SUPERIOR THORACIC SPINE
LOCATION DETAILED: EPIGASTRIC SKIN
LOCATION DETAILED: INFERIOR THORACIC SPINE
LOCATION DETAILED: RIGHT RADIAL DORSAL HAND
LOCATION DETAILED: RIGHT LATERAL SHOULDER
LOCATION DETAILED: LEFT LOWER CUTANEOUS LIP
LOCATION DETAILED: LEFT INFERIOR MEDIAL FOREHEAD
LOCATION DETAILED: LEFT ULNAR DORSAL HAND
LOCATION DETAILED: RIGHT SUPERIOR MEDIAL MIDBACK
LOCATION DETAILED: MID-OCCIPITAL SCALP
LOCATION DETAILED: MIDDLE STERNUM
LOCATION DETAILED: RIGHT KNEE
LOCATION DETAILED: RIGHT LATERAL PROXIMAL UPPER ARM

## 2025-01-15 NOTE — HPI: FULL BODY SKIN EXAMINATION
What Type Of Note Output Would You Prefer (Optional)?: Bullet Format
What Is The Reason For Today's Visit?: Full Body Skin Examination
What Is The Reason For Today's Visit? (Being Monitored For X): concerning skin lesions on a periodic basis
Additional History: She states that she was supposed to retreat with the Efudex on the dorsal hands and admits that she did not do not. She does have Betamethasone lotion on hand for seborrheic dermatitis on the scalp which she used twice a month for 3-4 days as needed for flares. She has a history of non-melanoma skin cancer on the right anterior shoulder, mid upper back, left chin, left lateral arm, right forehead, left chest, right anterior lower leg, and left dorsal hand. History of dysplastic nevus on the left forearm and the right knee.

## 2025-01-15 NOTE — PROCEDURE: PRESCRIPTION MEDICATION MANAGEMENT
Detail Level: Zone
Initiate Treatment: Augmented Betamethasone lotion twice daily for 2-3 weeks then twice a on Saturday, Sunday, and Wednesday
Render In Strict Bullet Format?: No
Initiate Treatment: Restart Efudex qhs for 4 weeks to the dorsal hands. She has this on hand, no refills given.

## 2025-01-15 NOTE — PROCEDURE: OBSERVATION
Body Location Override (Optional - Billing Will Still Be Based On Selected Body Map Location If Applicable): left chin, right anterior shoulder, mid upper back, left lateral arm, right forehead, left chest, right anterior lower leg, and left dorsal hand.
Detail Level: Detailed
Size Of Lesion In Cm (Optional): 0
Body Location Override (Optional - Billing Will Still Be Based On Selected Body Map Location If Applicable): right knee and left forearm

## 2025-02-19 ENCOUNTER — APPOINTMENT (OUTPATIENT)
Age: 65
Setting detail: DERMATOLOGY
End: 2025-02-19

## 2025-02-19 PROBLEM — C44.612 BASAL CELL CARCINOMA OF SKIN OF RIGHT UPPER LIMB, INCLUDING SHOULDER: Status: ACTIVE | Noted: 2025-02-19

## 2025-02-19 PROCEDURE — 11302 SHAVE SKIN LESION 1.1-2.0 CM: CPT

## 2025-02-19 PROCEDURE — ? SHAVE REMOVAL

## 2025-02-19 PROCEDURE — ? COUNSELING

## 2025-02-19 NOTE — HPI: HISTORY OF BASAL CELL CARCINOMA
What Is The Reason For Today's Visit?: Follow Up Basal Cell Carcinoma
How Many Bccs Have You Had?: one
When Was Your Last Cancer Diagnosed?: 01/2025
Additional History: Patient presents today to go over treatment options for Basal Cell on the right shoulder.

## 2025-02-19 NOTE — PROCEDURE: SHAVE REMOVAL
Medical Necessity Information: It is in your best interest to select a reason for this procedure from the list below. All of these items fulfill various CMS LCD requirements except the new and changing color options.
Medical Necessity Clause: This procedure was medically necessary because the lesion that was treated was: malignant
Lab: [id number]
Lab Facility: [id number]
Detail Level: Detailed
Was A Bandage Applied: Yes
Size Of Lesion In Cm (Required): 1.2
X Size Of Lesion In Cm (Optional): 0
Depth Of Shave: dermis
Biopsy Method: Personna blade
Anesthesia Type: 1% lidocaine with epinephrine
Hemostasis: Drysol
Wound Care: Petrolatum
Path Notes (To The Dermatopathologist): Please check margins
Render Path Notes In Note?: No
Consent was obtained from the patient. The risks and benefits to therapy were discussed in detail. Specifically, the risks of infection, scarring, bleeding, prolonged wound healing, incomplete removal, allergy to anesthesia, nerve injury and recurrence were addressed. Prior to the procedure, the treatment site was clearly identified and confirmed by the patient. All components of Universal Protocol/PAUSE Rule completed.
Post-Care Instructions: I reviewed with the patient in detail post-care instructions. Patient is to keep the biopsy site dry overnight, and then apply bacitracin twice daily until healed. Patient may apply hydrogen peroxide soaks to remove any crusting.
Notification Instructions: Patient will be notified of pathology results. However, patient instructed to call the office if not contacted within 2 weeks.
Billing Type: Third-Party Bill

## 2025-05-31 ENCOUNTER — HEALTH MAINTENANCE LETTER (OUTPATIENT)
Age: 65
End: 2025-05-31

## 2025-07-15 ENCOUNTER — APPOINTMENT (OUTPATIENT)
Age: 65
Setting detail: DERMATOLOGY
End: 2025-07-15

## 2025-07-15 DIAGNOSIS — Z87.2 PERSONAL HISTORY OF DISEASES OF THE SKIN AND SUBCUTANEOUS TISSUE: ICD-10-CM

## 2025-07-15 DIAGNOSIS — L57.0 ACTINIC KERATOSIS: ICD-10-CM

## 2025-07-15 DIAGNOSIS — Z85.828 PERSONAL HISTORY OF OTHER MALIGNANT NEOPLASM OF SKIN: ICD-10-CM

## 2025-07-15 PROCEDURE — ? LIQUID NITROGEN

## 2025-07-15 PROCEDURE — ? COUNSELING

## 2025-07-15 PROCEDURE — ? PRESCRIPTION MEDICATION MANAGEMENT

## 2025-07-15 PROCEDURE — ? OBSERVATION

## 2025-07-15 ASSESSMENT — LOCATION DETAILED DESCRIPTION DERM
LOCATION DETAILED: 3RD WEB SPACE RIGHT HAND
LOCATION DETAILED: RIGHT RADIAL DORSAL HAND
LOCATION DETAILED: LEFT LOWER CUTANEOUS LIP
LOCATION DETAILED: 1ST WEB SPACE RIGHT HAND
LOCATION DETAILED: RIGHT DORSAL INDEX FINGER METACARPOPHALANGEAL JOINT
LOCATION DETAILED: RIGHT KNEE
LOCATION DETAILED: RIGHT ULNAR DORSAL HAND

## 2025-07-15 ASSESSMENT — LOCATION SIMPLE DESCRIPTION DERM
LOCATION SIMPLE: RIGHT KNEE
LOCATION SIMPLE: LEFT LIP
LOCATION SIMPLE: RIGHT HAND
LOCATION SIMPLE: RIGHT THUMB

## 2025-07-15 ASSESSMENT — LOCATION ZONE DERM
LOCATION ZONE: LIP
LOCATION ZONE: LEG
LOCATION ZONE: HAND
LOCATION ZONE: FINGER

## 2025-07-15 NOTE — HPI: NON-MELANOMA SKIN CANCER F/U (HISTORY OF NMSC)
How Many Skin Cancers Have You Had?: more than one
What Is The Reason For Today's Visit?: History of Non-Melanoma Skin Cancer
Additional History: left chin, right anterior shoulder, mid upper back, left lateral arm, right forehead, left chest, right anterior lower leg, and left dorsal hand.

## 2025-07-15 NOTE — HPI: SKIN LESION (ACTINIC KERATOSES)
How Severe Are They?: mild
Is This A New Presentation, Or A Follow-Up?: Follow Up Actinic Keratoses
Additional History: She retreated with Efudex qhs for 2.5-3 weeks. She finished the treatment at the end of February 2025.

## 2025-07-15 NOTE — HPI: DYSPLASTIC NEVI F/U (HISTORY OF DYSPLASTIC NEVI)
What Is The Reason For Today's Visit?: History of Dysplastic Nevi
Additional History: Left forearm and right knee

## 2025-07-15 NOTE — PROCEDURE: PRESCRIPTION MEDICATION MANAGEMENT
Render In Strict Bullet Format?: No
Plan: She will plan to retreat with Efudex qhs for 4 weeks on the bilateral dorsal hand in the Fall 2025. Recommended using a zinc oxide or mineral sunblock daily.
Detail Level: Zone

## 2025-08-23 ENCOUNTER — HEALTH MAINTENANCE LETTER (OUTPATIENT)
Age: 65
End: 2025-08-23